# Patient Record
Sex: FEMALE | Race: WHITE | NOT HISPANIC OR LATINO | Employment: FULL TIME | URBAN - METROPOLITAN AREA
[De-identification: names, ages, dates, MRNs, and addresses within clinical notes are randomized per-mention and may not be internally consistent; named-entity substitution may affect disease eponyms.]

---

## 2019-07-24 NOTE — PROGRESS NOTES
"    Subjective:     Encounter Date:07/26/2019    Patient ID: Conchita Acevedo is a 48 y.o.  white female from [place], [place], .    REFERRING PHYSICIAN: [facility]  BARIATRIC SURGEON: BRANDON Jacobs MD  INTEGRATED HORMONE SPECIALIST: Fabi Martinez MD  HEMATOLOGIST: ROBBY Quinn MD  NEUROLOGIST: Madelyn Hanna MD  REMOTE CARDIOLOGIST: Trent Schafer MD    Chief Complaint:   Chief Complaint   Patient presents with   • Palpitations     Consult    • Chest Pain     Problem List:  1. Palpitations:  a. Remote stress test, echocardiogram, and CT cardiac calcium score at [facility] approximately 2007; negative per patient-data deficit  b. Remote stress test and echocardiogram approximately 2010; negative per patient-data deficit, performed for cardiac clearance before her gastric sleeve  c. Remote stress test approximately 2014 at [facility]; negative per patient-data deficit  d. Residual class I symptoms with acceptable EKG, July 2019  2. GERD  3. Anxiety/depression  4. PCOS  5. Type 2 diabetes mellitus; hemoglobin A1c 7.7%  6. Thyroid dysfunction  7. Mild obesity: BMI 31.1  8. Chronic low ferritin levels  9. History of mitral valve prolapse  10. Family history of early CAD: Mother with MI at 45, followed by CABG x4 vessels, multiple paternal relatives with \"widowmakers\" in their 50s  11. Surgical history:  a. Gastric sleeve, July 2010  b. Lap cholecystectomy, April 2011  c. Hiatal hernia repair with partial gastrectomy, July 2015  d. Back surgery  e. ANTWON  f. Bladder sling    Allergies   Allergen Reactions   • Amoxil [Amoxicillin] GI Intolerance   • Lovenox [Enoxaparin] Rash   • Sulfa Antibiotics Rash         Current Outpatient Medications:   •  aspirin 81 MG EC tablet, Take 81 mg by mouth Daily., Disp: , Rfl:   •  Cholecalciferol (VITAMIN D3) 5000 units capsule capsule, Take 5,000 Units by mouth Daily., Disp: , Rfl:   •  escitalopram (LEXAPRO) 20 MG " "tablet, Take 20 mg by mouth Daily., Disp: , Rfl:   •  INVOKAMET  MG tablet, Take 1 tablet by mouth 2 (Two) Times a Day With Meals., Disp: , Rfl: 6  •  Multiple Vitamin (MULTI VITAMIN PO), Take  by mouth Daily., Disp: , Rfl:   •  omeprazole (priLOSEC) 40 MG capsule, Take 40 mg by mouth Daily., Disp: , Rfl:   •  Thyroid 60 MG PO tablet, Take 60 mg by mouth Daily., Disp: , Rfl:     History of Present Illness  This Is a 48-year-old white female who presents to establish cardiology care today for having intermittent palpitations on a daily basis as well as \"chest fullness.\"  When she has the palpitations, they only last a couple of seconds and then resolve.  She drinks a couple of cups of coffee in the morning and occasionally has a glass of unsweetened ice tea in the afternoon, but this is not very frequent.  She denies any decongestant use.  She has concerns because she has a strong family history of early CAD.  She states that all of her father's brothers and other family members have had  makers and have had heart attacks in their 50s.  She states that her father was the only one that did not have a heart attack until age 62.  Her mother also had early CAD and had her first MI at 45 years of age and had a CABG x4 vessels.  Her mother passed away at age 65 from uncontrolled type 2 diabetes mellitus and had been on dialysis.  She denies any PIH or preeclampsia during her two pregnancies, and the patient had diabetes mellitus before she got pregnant and switched to insulin while she was pregnant and then back to metformin after she delivered.  She has noticed lately that her hemoglobin A1c has been elevating, and 2 weeks ago, she was switched from metformin to Invokana.  She has known GERD and has had a hiatal hernia repair.  She has noticed more symptoms of burping and feels like her reflux is traveling up her chest sometimes.  She does not have orthopnea but occasionally snores; however, she denies any " apneic spells or daytime sleepiness.  She denies any MIs, heart catheterizations, heart monitors, hypertension, hyperlipidemia, CVAs, TIAs, seizures, DVTs, PEs, kidney dysfunction, COPD, asthma, claudication, or cardiomegaly.  She was remotely diagnosed with mitral valve prolapse but has had repeat echocardiograms which did not demonstrate this.  She has no thyroid dysfunction and states that she cannot be diagnosed as hypo-or hyperthyroid but that her ratios were out of balance, so she is now on thyroid 60 mg daily.  She remotely had a couple of stress tests.  Before she had her gastric sleeve surgery, she had a stress test for cardiac clearance.  She had another stress test approximately 5 years ago at the [facility] which was also negative.  She has had a couple of echocardiograms but has never had to wear a heart monitor in the past.  After her second daughter was born, she was experiencing some chest pain and palpitations, and at the [facility], they wanted to do a heart catheterization, but the patient did not want to do that at that time because her daughter was only 2 months old.  Instead, she did a CT cardiac calcium score, which was apparently normal.  She has had some left lower extremity swelling lately and saw her physician who did some blood work and a venous duplex which were both normal-data deficit.  She chronically has had low ferritin and is supposed to see a hematologist soon for possible iron infusions.  She denies any anemia or melena.  The patient also denies any shortness of breath, dizziness, presyncope, or syncope.  She has never had rheumatic fever or any other prolonged fevers as a child.  Patient feels that her Prilosec does not help her GERD.  She has been told by her neurologist that she should not walk on a treadmill.  Patient otherwise denies prolonged chest pain, unrelieved shortness of breath, PND, edema, sustained tachypalpitations, syncope or presyncope  at this time.    Cardiovascular Disease Risk Factors  diabetes mellitus, family history, obesity    Social History     Socioeconomic History   • Marital status:      Spouse name: Not on file   • Number of children: 2   • Years of education: Not on file   • Highest education level: Not on file   Tobacco Use   • Smoking status: Never Smoker   • Smokeless tobacco: Never Used   Substance and Sexual Activity   • Alcohol use: No     Frequency: Never   • Drug use: No   • Sexual activity: Defer       Family History   Problem Relation Age of Onset   • Diabetes Mother    • Heart disease Mother    • Kidney failure Mother    • Heart disease Father    • Stroke Father    • Diabetes Father    • Heart attack Father    • Heart disease Maternal Grandmother    • Diabetes Maternal Grandmother    • Stroke Maternal Grandmother    • Heart disease Paternal Grandmother    • Diabetes Paternal Grandmother    • Stroke Paternal Grandmother    · Mother: MI at 45, CABG x4, uncontrolled type 2 diabetes mellitus, on dialysis,  at 65  · Father: MI at 62 with  maker  · Multiple paternal relatives with MIs/ makers in their 50s  · 2 daughters: Metabolic syndrome  · Sister: Palpitations    Review of Systems   Constitution: Positive for malaise/fatigue.   HENT: Negative.    Eyes: Negative.    Cardiovascular: Positive for chest pain, irregular heartbeat, leg swelling and palpitations. Negative for claudication, dyspnea on exertion, near-syncope, orthopnea, paroxysmal nocturnal dyspnea and syncope.   Respiratory: Positive for snoring. Negative for shortness of breath and sleep disturbances due to breathing.    Endocrine:        Thyroid dysfunction, type 2 diabetes mellitus   Hematologic/Lymphatic:        Chronic low ferritin levels   Skin: Negative.    Musculoskeletal: Negative.    Gastrointestinal: Positive for abdominal pain, change in bowel habit and heartburn. Negative for melena.   Genitourinary: Positive for bladder  "incontinence and frequency.   Neurological: Negative for excessive daytime sleepiness, dizziness, focal weakness, light-headedness and seizures.   Psychiatric/Behavioral: The patient is nervous/anxious.    Allergic/Immunologic: Negative.       Obtained and negative except as outlined in problem list and HPI.      ECG 12 Lead  Date/Time: 7/26/2019 12:33 PM  Performed by: Shan Ross MD  Authorized by: Shan Ross MD   Rhythm comments: Sinus bradycardia, low voltage QRS, borderline ECG, 58 bpm,  ms, QRS 80 ms,  ms, no significant changes from last ECG in August 2016                 Objective:       Vitals:    07/26/19 1035 07/26/19 1036 07/26/19 1037 07/26/19 1038   BP: 128/77 119/78 132/77 120/76   BP Location: Left arm Left arm Right arm Right arm   Patient Position: Sitting Standing Sitting Standing   Pulse: 58 69 59 68   Weight: 80.8 kg (178 lb 3.2 oz)      Height: 161.3 cm (63.5\")        Body mass index is 31.07 kg/m².     Physical Exam   Constitutional: She is oriented to person, place, and time. She appears well-developed and well-nourished.   HENT:   Mouth/Throat: Uvula is midline, oropharynx is clear and moist and mucous membranes are normal. She does not have dentures. No oral lesions. Normal dentition. No dental abscesses, uvula swelling, lacerations or dental caries.   Eyes:   Fundoscopic exam:       The right eye shows AV nicking. The right eye shows no exudate and no hemorrhage.        The left eye shows AV nicking. The left eye shows no exudate and no hemorrhage.   Neck: No JVD present. Carotid bruit is not present. No thyromegaly present.   Cardiovascular: Regular rhythm, S1 normal and S2 normal. Exam reveals no gallop, no S3 and no friction rub.   Murmur heard.   Medium-pitched harsh early systolic murmur is present with a grade of 1/6 at the lower left sternal border.  Pulses:       Dorsalis pedis pulses are 2+ on the right side, and 2+ on the left side.        Posterior tibial " pulses are 2+ on the right side, and 2+ on the left side.   Pulmonary/Chest: Effort normal and breath sounds normal. She has no wheezes. She has no rhonchi. She has no rales.   Abdominal: Soft. She exhibits no mass. There is no hepatosplenomegaly. There is no tenderness. There is no guarding.   Bowel sounds audible x4   Musculoskeletal: Normal range of motion. She exhibits edema (LLE 1+).   Lymphadenopathy:     She has no cervical adenopathy.   Neurological: She is alert and oriented to person, place, and time.   Skin: Skin is warm, dry and intact. No rash noted.   Vitals reviewed.      Lab Review:   Results for orders placed or performed in visit on 12/19/16   Tissue Exam   Result Value Ref Range    Case Report       Surgical Pathology Report                         Case: [id number]                                  Authorizing Provider:  Narendra Jacobs MD     Collected:           12/19/2016 10:03 AM          Pathologist:           Winter Stern MD       Received:            12/19/2016 01:33 PM          Specimens:   1) - Gastric, Antrum                                                                                2) - Esophagus, Distal                                                                     Clinical Information       The working history is heartburn.       Final Diagnosis       1. GASTRIC ANTRUM BIOPSIES:  Reactive gastritis.   2. DISTAL ESOPHAGEAL BIOPSIES:  Histologic findings compatible with reflux.   MHB/klb       Gross Description       Specimen 1 received in formalin labeled antrum biopsy is a 0.6x0.3x0.3 cm tan soft tissue fragment submitted in toto in cassette 1.    Specimen 2 received in formalin labeled distal esophagus is a 0.3x0.3x0.3 cm tan soft tissue fragment submitted in toto in cassette 2. HBM/mbc       Microscopic Description       Sections show mild reactive and regenerative changes and slight chronic inflammation.     Specimen 2 shows predominantly hyperplastic squamous  "mucosa with underlying chronic inflammation. No York's metaplasia, or dysplasia is seen.       Embedded Images       ECG 8/2/2016:  Sinus bradycardia  Otherwise normal ECG  When compared with ECG of 24-APR-2015 10:38,  No significant change was found  Confirmed by TEAGAN WILCOX MD ([id number]) on 8/2/2016 6:33:36 PM      Assessment:   Patient with intermittent palpitations and \"chest fullness\" with history of MVP and strong family history of early CAD. We will order an echocardiogram to assess heart structure/function as well as a Dobutamine stress echocardiogram to rule out focal obstructive CAD. The patient is unable to walk on a treadmill. We will also order a CT cardiac calcium score to assess any coronary calcification. She has had intermittent short lived palpitations, and we will place a ZioXT while in office today to assess for any arrhythmias, PAF, or pauses. Her GERD has been unrelieved by Prilosec, so we will change this to Dexilant 60 mg daily.       Diagnosis Plan   1. Palpitations  Holter Monitor - 72 Hour Up To 21 Days    Adult Stress Echo W/ Cont or Stress Agent if Necessary Per Protocol, echocardiogram    2. Chest pain, unspecified type  CT cardiac calcium score w/o dye   3. Family history of early CAD  Dobutamine stress echo   4. Chest pain due to GERD  Change Prilosec to dexilant 60mg daily          Plan:   1. Patient to continue current medications and close follow up with the above providers other than to discontinue Prilosec and initiate Dexilant 60 mg daily.  2. Tentative cardiology follow up in 6 weeks or patient may return sooner PRN.  3. ZioXT monitor placed today  4. Echocardiogram  5. Dobutamine stress echo  6. CT cardiac calcium score  7. 1 800 card    Scribed for Teagan Wilcox MD by Brenda Shah, APRN. 7/26/2019  12:41 PM    I, Teagan Wilcox MD, [facility], personally performed the services described in this documentation as scribed by the above named individual in my presence, and it is " both accurate and complete. At 12:58 PM on 07/26/2019

## 2019-07-26 ENCOUNTER — CONSULT (OUTPATIENT)
Dept: CARDIOLOGY | Facility: CLINIC | Age: 49
End: 2019-07-26

## 2019-07-26 VITALS
BODY MASS INDEX: 30.42 KG/M2 | HEIGHT: 64 IN | HEART RATE: 68 BPM | DIASTOLIC BLOOD PRESSURE: 76 MMHG | SYSTOLIC BLOOD PRESSURE: 120 MMHG | WEIGHT: 178.2 LBS

## 2019-07-26 DIAGNOSIS — R07.9 CHEST PAIN DUE TO GERD: ICD-10-CM

## 2019-07-26 DIAGNOSIS — Z82.49 FAMILY HISTORY OF EARLY CAD: ICD-10-CM

## 2019-07-26 DIAGNOSIS — R00.2 PALPITATIONS: Primary | ICD-10-CM

## 2019-07-26 DIAGNOSIS — K21.9 CHEST PAIN DUE TO GERD: ICD-10-CM

## 2019-07-26 DIAGNOSIS — R07.9 CHEST PAIN, UNSPECIFIED TYPE: ICD-10-CM

## 2019-07-26 PROBLEM — E07.9 THYROID DYSFUNCTION: Status: ACTIVE | Noted: 2019-07-26

## 2019-07-26 PROBLEM — R07.89 CHEST DISCOMFORT: Status: ACTIVE | Noted: 2019-07-26

## 2019-07-26 PROBLEM — R79.0 LOW FERRITIN: Status: ACTIVE | Noted: 2019-07-26

## 2019-07-26 PROBLEM — E11.8 TYPE 2 DIABETES MELLITUS WITH COMPLICATION, WITHOUT LONG-TERM CURRENT USE OF INSULIN (HCC): Status: ACTIVE | Noted: 2019-07-26

## 2019-07-26 PROCEDURE — 99204 OFFICE O/P NEW MOD 45 MIN: CPT | Performed by: INTERNAL MEDICINE

## 2019-07-26 PROCEDURE — 93000 ELECTROCARDIOGRAM COMPLETE: CPT | Performed by: INTERNAL MEDICINE

## 2019-07-26 RX ORDER — ESCITALOPRAM OXALATE 20 MG/1
20 TABLET ORAL DAILY
COMMUNITY

## 2019-07-26 RX ORDER — CANAGLIFLOZIN AND METFORMIN HYDROCHLORIDE 50; 500 MG/1; MG/1
1 TABLET, FILM COATED ORAL 2 TIMES DAILY WITH MEALS
Refills: 6 | COMMUNITY
Start: 2019-07-15 | End: 2019-11-14

## 2019-07-26 RX ORDER — DEXLANSOPRAZOLE 60 MG/1
60 CAPSULE, DELAYED RELEASE ORAL DAILY
Qty: 30 CAPSULE | Refills: 0 | Status: SHIPPED | OUTPATIENT
Start: 2019-07-26 | End: 2019-08-29 | Stop reason: SDUPTHER

## 2019-07-26 RX ORDER — ASPIRIN 81 MG/1
81 TABLET ORAL DAILY
COMMUNITY
End: 2019-12-20 | Stop reason: HOSPADM

## 2019-07-26 RX ORDER — LEVOTHYROXINE AND LIOTHYRONINE 38; 9 UG/1; UG/1
45 TABLET ORAL DAILY
COMMUNITY

## 2019-07-26 RX ORDER — OMEPRAZOLE 40 MG/1
40 CAPSULE, DELAYED RELEASE ORAL DAILY
COMMUNITY
End: 2019-07-26 | Stop reason: ALTCHOICE

## 2019-07-30 ENCOUNTER — TELEPHONE (OUTPATIENT)
Dept: CARDIOLOGY | Facility: CLINIC | Age: 49
End: 2019-07-30

## 2019-07-30 NOTE — TELEPHONE ENCOUNTER
Pt called, Zio monitor fell off Friday night, after getting if put on Friday morning.     Pt is mailing Zio back to company and Dori if mailing a new monitor to the pt to put on herself and wear for 14 days.     Pt verbalized understanding and it agreeable to plan.

## 2019-08-22 ENCOUNTER — HOSPITAL ENCOUNTER (OUTPATIENT)
Dept: CT IMAGING | Facility: HOSPITAL | Age: 49
Discharge: HOME | End: 2019-08-22
Admitting: INTERNAL MEDICINE

## 2019-08-22 ENCOUNTER — HOSPITAL ENCOUNTER (OUTPATIENT)
Dept: CARDIOLOGY | Facility: HOSPITAL | Age: 49
Discharge: HOME | End: 2019-08-22
Admitting: INTERNAL MEDICINE

## 2019-08-22 DIAGNOSIS — R00.2 PALPITATIONS: ICD-10-CM

## 2019-08-22 DIAGNOSIS — R07.9 CHEST PAIN, UNSPECIFIED TYPE: ICD-10-CM

## 2019-08-22 LAB
BH CV ECHO MEAS - AO ROOT AREA (BSA CORRECTED): 1.5
BH CV ECHO MEAS - AO ROOT AREA: 6.2 CM^2
BH CV ECHO MEAS - AO ROOT DIAM: 2.8 CM
BH CV ECHO MEAS - BSA(HAYCOCK): 1.9 M^2
BH CV ECHO MEAS - BSA: 1.8 M^2
BH CV ECHO MEAS - BZI_BMI: 31.5 KILOGRAMS/M^2
BH CV ECHO MEAS - BZI_METRIC_HEIGHT: 160 CM
BH CV ECHO MEAS - BZI_METRIC_WEIGHT: 80.7 KG
BH CV ECHO MEAS - EDV(CUBED): 58.3 ML
BH CV ECHO MEAS - EDV(TEICH): 65 ML
BH CV ECHO MEAS - EF(CUBED): 89.6 %
BH CV ECHO MEAS - EF(TEICH): 84.5 %
BH CV ECHO MEAS - ESV(CUBED): 6.1 ML
BH CV ECHO MEAS - ESV(TEICH): 10.1 ML
BH CV ECHO MEAS - FS: 52.9 %
BH CV ECHO MEAS - IVS/LVPW: 1.1
BH CV ECHO MEAS - IVSD: 1 CM
BH CV ECHO MEAS - LA DIMENSION: 3.6 CM
BH CV ECHO MEAS - LA/AO: 1.3
BH CV ECHO MEAS - LAD MAJOR: 4.6 CM
BH CV ECHO MEAS - LAT PEAK E' VEL: 14.1 CM/SEC
BH CV ECHO MEAS - LATERAL E/E' RATIO: 6.1
BH CV ECHO MEAS - LV MASS(C)D: 114.1 GRAMS
BH CV ECHO MEAS - LV MASS(C)DI: 62 GRAMS/M^2
BH CV ECHO MEAS - LVIDD: 3.9 CM
BH CV ECHO MEAS - LVIDS: 2.4 CM
BH CV ECHO MEAS - LVPWD: 0.9 CM
BH CV ECHO MEAS - MED PEAK E' VEL: 7.9 CM/SEC
BH CV ECHO MEAS - MEDIAL E/E' RATIO: 10.9
BH CV ECHO MEAS - MV A MAX VEL: 75 CM/SEC
BH CV ECHO MEAS - MV DEC SLOPE: 465.9 CM/SEC^2
BH CV ECHO MEAS - MV DEC TIME: 0.17 SEC
BH CV ECHO MEAS - MV E MAX VEL: 87.9 CM/SEC
BH CV ECHO MEAS - MV E/A: 1.2
BH CV ECHO MEAS - MV P1/2T MAX VEL: 114 CM/SEC
BH CV ECHO MEAS - MV P1/2T: 71.7 MSEC
BH CV ECHO MEAS - MVA P1/2T LCG: 1.9 CM^2
BH CV ECHO MEAS - MVA(P1/2T): 3.1 CM^2
BH CV ECHO MEAS - PA ACC SLOPE: 618.6 CM/SEC^2
BH CV ECHO MEAS - PA ACC TIME: 0.16 SEC
BH CV ECHO MEAS - PA PR(ACCEL): 6.1 MMHG
BH CV ECHO MEAS - RAP SYSTOLE: 3 MMHG
BH CV ECHO MEAS - RV MAX PG: 1.2 MMHG
BH CV ECHO MEAS - RV V1 MAX: 55.8 CM/SEC
BH CV ECHO MEAS - RVSP: 15 MMHG
BH CV ECHO MEAS - SI(CUBED): 28.4 ML/M^2
BH CV ECHO MEAS - SI(TEICH): 29.9 ML/M^2
BH CV ECHO MEAS - SV(CUBED): 52.2 ML
BH CV ECHO MEAS - SV(TEICH): 54.9 ML
BH CV ECHO MEAS - TAPSE (>1.6): 1.8 CM2
BH CV ECHO MEAS - TR MAX PG: 12 MMHG
BH CV ECHO MEAS - TR MAX VEL: 174.4 CM/SEC
BH CV ECHO MEASUREMENTS AVERAGE E/E' RATIO: 7.99
BH CV STRESS BP STAGE 1: NORMAL
BH CV STRESS BP STAGE 2: NORMAL
BH CV STRESS BP STAGE 3: NORMAL
BH CV STRESS BP STAGE 4: NORMAL
BH CV STRESS DOSE DOBUTAMINE STAGE 1: 10
BH CV STRESS DOSE DOBUTAMINE STAGE 2: 20
BH CV STRESS DOSE DOBUTAMINE STAGE 3: 30
BH CV STRESS DOSE DOBUTAMINE STAGE 4: 40
BH CV STRESS DURATION MIN STAGE 1: 2
BH CV STRESS DURATION MIN STAGE 2: 2
BH CV STRESS DURATION MIN STAGE 3: 2
BH CV STRESS DURATION MIN STAGE 4: 2
BH CV STRESS DURATION SEC STAGE 1: 0
BH CV STRESS DURATION SEC STAGE 2: 0
BH CV STRESS DURATION SEC STAGE 3: 0
BH CV STRESS DURATION SEC STAGE 4: 25
BH CV STRESS ECHO POST STRESS EJECTION FRACTION EF: 75 %
BH CV STRESS HR STAGE 1: 85
BH CV STRESS HR STAGE 2: 104
BH CV STRESS HR STAGE 3: 134
BH CV STRESS HR STAGE 4: 144
BH CV STRESS PROTOCOL 1: NORMAL
BH CV STRESS RATE STAGE 1: 40
BH CV STRESS RATE STAGE 2: 80
BH CV STRESS RATE STAGE 3: 119
BH CV STRESS RATE STAGE 4: 159
BH CV STRESS RECOVERY BP: NORMAL MMHG
BH CV STRESS RECOVERY HR: 94 BPM
BH CV STRESS STAGE 1: 1
BH CV STRESS STAGE 2: 2
BH CV STRESS STAGE 3: 3
BH CV STRESS STAGE 4: 4
BH CV XLRA - RV BASE: 3.8 CM
BH CV XLRA - RV LENGTH: 5.2 CM
BH CV XLRA - RV MID: 3.2 CM
BH CV XLRA - TDI S': 13.6 CM/SEC
LEFT ATRIUM VOLUME INDEX: 20.1 ML/M^2
LEFT ATRIUM VOLUME: 37 ML
LV EF 2D ECHO EST: 68 %
MAXIMAL PREDICTED HEART RATE: 173 BPM
PERCENT MAX PREDICTED HR: 86.71 %
STRESS BASELINE BP: NORMAL MMHG
STRESS BASELINE HR: 57 BPM
STRESS PERCENT HR: 102 %
STRESS POST ESTIMATED WORKLOAD: 1 METS
STRESS POST EXERCISE DUR MIN: 8 MIN
STRESS POST EXERCISE DUR SEC: 25 SEC
STRESS POST PEAK BP: NORMAL MMHG
STRESS POST PEAK HR: 150 BPM
STRESS TARGET HR: 147 BPM

## 2019-08-22 PROCEDURE — 93350 STRESS TTE ONLY: CPT | Performed by: INTERNAL MEDICINE

## 2019-08-22 PROCEDURE — 25010000002 DOBUTAMINE PER 250 MG: Performed by: INTERNAL MEDICINE

## 2019-08-22 PROCEDURE — 93018 CV STRESS TEST I&R ONLY: CPT | Performed by: INTERNAL MEDICINE

## 2019-08-22 PROCEDURE — 75571 CT HRT W/O DYE W/CA TEST: CPT

## 2019-08-22 PROCEDURE — 93325 DOPPLER ECHO COLOR FLOW MAPG: CPT

## 2019-08-22 PROCEDURE — 93320 DOPPLER ECHO COMPLETE: CPT | Performed by: INTERNAL MEDICINE

## 2019-08-22 PROCEDURE — 93320 DOPPLER ECHO COMPLETE: CPT

## 2019-08-22 PROCEDURE — 93350 STRESS TTE ONLY: CPT

## 2019-08-22 PROCEDURE — 93325 DOPPLER ECHO COLOR FLOW MAPG: CPT | Performed by: INTERNAL MEDICINE

## 2019-08-22 PROCEDURE — 93017 CV STRESS TEST TRACING ONLY: CPT

## 2019-08-22 RX ORDER — DOBUTAMINE HYDROCHLORIDE 100 MG/100ML
20 INJECTION INTRAVENOUS
Status: DISCONTINUED | OUTPATIENT
Start: 2019-08-22 | End: 2019-08-23 | Stop reason: HOSPADM

## 2019-08-22 RX ADMIN — DOBUTAMINE HYDROCHLORIDE 20 MCG/KG/MIN: 100 INJECTION INTRAVENOUS at 14:09

## 2019-08-29 RX ORDER — DEXLANSOPRAZOLE 60 MG/1
60 CAPSULE, DELAYED RELEASE ORAL DAILY
Qty: 30 CAPSULE | Refills: 11 | Status: SHIPPED | OUTPATIENT
Start: 2019-08-29 | End: 2019-12-10 | Stop reason: SDUPTHER

## 2019-08-29 NOTE — TELEPHONE ENCOUNTER
Pt called to verify results of stress ECHO that pt received via [facility] letter.    Told patient that LHC was not indicated at this time.    Pt verbalizes understanding and agreeable.    Pt asked about holter monitor results, but those have not been received yet.  Pt states she mailed monitor back around 8/15/19. Spoke with Dori and she states the monitor is registered to patient, so were waiting on the company to send those results.     Pt wanted refills of Dexilant 60 mg daily, refill sent to [facility].

## 2019-08-30 ENCOUNTER — OFFICE VISIT (OUTPATIENT)
Dept: BARIATRICS/WEIGHT MGMT | Facility: CLINIC | Age: 49
End: 2019-08-30

## 2019-08-30 VITALS
SYSTOLIC BLOOD PRESSURE: 118 MMHG | OXYGEN SATURATION: 99 % | RESPIRATION RATE: 18 BRPM | DIASTOLIC BLOOD PRESSURE: 70 MMHG | WEIGHT: 177 LBS | HEIGHT: 64 IN | HEART RATE: 52 BPM | BODY MASS INDEX: 30.22 KG/M2 | TEMPERATURE: 96.1 F

## 2019-08-30 DIAGNOSIS — Z90.3 POSTGASTRECTOMY MALABSORPTION: ICD-10-CM

## 2019-08-30 DIAGNOSIS — Z13.21 MALNUTRITION SCREEN: ICD-10-CM

## 2019-08-30 DIAGNOSIS — R53.83 FATIGUE, UNSPECIFIED TYPE: ICD-10-CM

## 2019-08-30 DIAGNOSIS — Z98.84 STATUS POST BARIATRIC SURGERY: Primary | ICD-10-CM

## 2019-08-30 DIAGNOSIS — K21.9 GASTROESOPHAGEAL REFLUX DISEASE, ESOPHAGITIS PRESENCE NOT SPECIFIED: ICD-10-CM

## 2019-08-30 DIAGNOSIS — K91.2 POSTGASTRECTOMY MALABSORPTION: ICD-10-CM

## 2019-08-30 DIAGNOSIS — Z13.0 SCREENING, IRON DEFICIENCY ANEMIA: ICD-10-CM

## 2019-08-30 DIAGNOSIS — R14.2 BELCHING: ICD-10-CM

## 2019-08-30 DIAGNOSIS — E55.9 HYPOVITAMINOSIS D: ICD-10-CM

## 2019-08-30 DIAGNOSIS — E66.9 OBESITY, CLASS I, BMI 30-34.9: ICD-10-CM

## 2019-08-30 PROCEDURE — 99214 OFFICE O/P EST MOD 30 MIN: CPT | Performed by: PHYSICIAN ASSISTANT

## 2019-08-30 NOTE — PROGRESS NOTES
[facility]  [address]  [facility] [id number] [phone]        Patient Name:  Conchita Acevedo.  :  1970        Reason for Visit:    9 years s/p LSG, reflux     HPI: Conchita Acevedo is a 49 y.o. female s/p LSG with Dr. Jacobs 7/8/10, s/p lap courtney/ intraop cholangiogram/ liver biopsy (no abnormality) with Dr. Jacobs 11, s/p lap HHR with partial gastrectomy (for outpouching of the cardia on the greater curvature side) by Dr. Jacobs 7/24/15.    UGI at [facility] 16 IMPRESSION- Small residual hiatal hernia.     EGD with Dr. Jacobs 8/3/16- mild gastrits and large surture at GE junction with surrounding foreign body reaction, suture was removed, no obvious HH, sleeve was fairly unremarkable. Distal esphageal bx revealsed ulcerative esophagitis with candida and gram + cocci. Pt was treated with biaxin + diflucan, advised h pylori testing but did not get resulted.    LOV 10/21/16- symptoms improved. Occ vomiting/ regurg.     EGD with Dr. Jacobs 16- some mild diffuse gastritis, otherwise unremarkable post sleeve upper endoscopy. No redundant cardia. No foreign bodies, no secretions, irregular z line at 35cm, no obvious elizabeth's esophagus but perhaps very short segment.     Final Diagnosis   1. GASTRIC ANTRUM BIOPSIES:  Reactive gastritis.   2. DISTAL ESOPHAGEAL BIOPSIES:  Histologic findings compatible with reflux       Presents today with a few concerns. Overall is very pleased with weightloss, happy to hear BMI is 30.9, would absolutely do LSG over again.   Is seeing integrated hormone specialist in [place] who has some concerns re: malabsorption with continued low ferritin levels and worsening A1C (6ish to 7ish%). Ferritin has been low x 2 years, although not anemic. Failed PO therapy, last labs ferritin 8, referred to hematologist. Now on TID Iron + folate + B12 + C.  Also saw Dr. Thornton Cardiologist who suggested f/u here due to symptoms of  uncontrolled reflux and frequent belching x 8 months.  Was on omeprazole 40mg daily with  episodes of uncontrolled reflux that would last for a couple days and require BID PPI + tums, 6-8 days of the month.  Recently changed to dexilant, has helped reflux with no breakthrough symptoms.  No CP or chest pressure or dysphagia.   Has also had belching daily, with most PO intake, ongoing x 8 months.  Has lower abdominal pain preceding BM occasionally, with no upper abdominal pain.  A1C has increased despite diet control with reduction of carbs.   Questioning whether gastric bypass is an appropriate next step to help with DM.   No other issues/concerns. Denies dysphagia, nausea, vomiting, pulmonary issues and fevers.      Getting +g prot/day, not taking protein supplements.  Typical day: Oatmeal or nutrigrain waffle for breakfast, chicken/ protein + vegetable for lunch, chicken/ protein meat + vegetables + sweet potato or rice for dinner.   Drinking 64++ fluid oz/day, coffee in am + water, possibly unsweet tea.    Last labs revealed low ferritin from hematologist .  Taking MVI and Vit D 5000 daily, now on iron TID + C + folic acid + B12.  On dexilant.  Exercising.    Presurgery weight: 222 pounds.  Today's weight is 80.3 kg (177 lb) pounds, today's  Body mass index is 30.86 kg/m²., and her weight loss since surgery is 45 pounds.      Past Medical History:   Diagnosis Date   • Acid reflux    • Anxiety    • Diabetes mellitus (CMS/HCC)    • MRSA infection    • PCOS (polycystic ovarian syndrome)    • Thyroid disorder      Past Surgical History:   Procedure Laterality Date   • BACK SURGERY  2015   • BLADDER SLING MODIFIED, ANTERIOR AND POSTERIOR VAGINAL REPAIR  2016   • ENDOSCOPY  12/19/2016     Dr. Jacobs, some mild diffuse gastritis,   • ENDOSCOPY  08/03/2016    Dr. Jacobs, mild gastrits and large surture at GE junction with surrounding foreign body reaction, suture was removed, no obvious HH, sleeve was fairly  "unremarkable. Distal esphageal bx revealsed ulcerative esophagitis with candida and gram + cocci.    • GASTRIC SLEEVE LAPAROSCOPIC  07/08/2010   • HIATAL HERNIA REPAIR  07/2015    Dr. Jacobs, [facility] partial gastectomy for outpouching of cardia   • HYSTERECTOMY  2018    vaginal, partial   • LAPAROSCOPIC CHOLECYSTECTOMY  04/13/2011    Dr. Jacobs     Outpatient Medications Marked as Taking for the 8/30/19 encounter (Office Visit) with Dori Patel PA-C   Medication Sig Dispense Refill   • aspirin 81 MG EC tablet Take 81 mg by mouth Daily.     • Cholecalciferol (VITAMIN D3) 5000 units capsule capsule Take 5,000 Units by mouth Daily.     • dexlansoprazole (DEXILANT) 60 MG capsule Take 1 capsule by mouth Daily. 30 capsule 11   • escitalopram (LEXAPRO) 20 MG tablet Take 20 mg by mouth Daily.     • INVOKAMET  MG tablet Take 1 tablet by mouth 2 (Two) Times a Day With Meals.  6   • Multiple Vitamin (MULTI VITAMIN PO) Take  by mouth Daily.     • Thyroid 60 MG PO tablet Take 60 mg by mouth Daily.         Allergies   Allergen Reactions   • Amoxil [Amoxicillin] GI Intolerance   • Lovenox [Enoxaparin] Rash   • Sulfa Antibiotics Rash       Social History     Socioeconomic History   • Marital status:      Spouse name: Not on file   • Number of children: Not on file   • Years of education: Not on file   • Highest education level: Not on file   Tobacco Use   • Smoking status: Never Smoker   • Smokeless tobacco: Never Used   Substance and Sexual Activity   • Alcohol use: No     Frequency: Never   • Drug use: No   • Sexual activity: Defer   Social History Narrative    Lives in [place] with  and 2 daughters. Works as .        /70 (BP Location: Left arm, Patient Position: Sitting, Cuff Size: Adult)   Pulse 52   Temp 96.1 °F (35.6 °C) (Temporal)   Resp 18   Ht 161.3 cm (63.5\")   Wt 80.3 kg (177 lb)   SpO2 99%   BMI 30.86 kg/m²     Physical Exam   Constitutional: She is oriented to person, " place, and time. She appears well-developed and well-nourished.   HENT:   Head: Normocephalic and atraumatic.   Cardiovascular: Normal rate, regular rhythm and normal heart sounds.   Pulmonary/Chest: Effort normal and breath sounds normal. No respiratory distress. She has no wheezes.   Abdominal: Soft. Bowel sounds are normal. She exhibits no distension. There is no tenderness.   Incisions healing well   Neurological: She is alert and oriented to person, place, and time.   Skin: Skin is warm and dry.   Psychiatric: She has a normal mood and affect. Her behavior is normal. Judgment and thought content normal.         Assessment:  s/p LSG with Dr. Jacobs 7/8/10, s/p courntey with Dr. Jacobs 2011, s/p lap HHR with partial gastrectomy (for outpouching of the cardia on the greater curvature side) by Dr. Jacobs 7/2015.      ICD-10-CM ICD-9-CM   1. Status post bariatric surgery Z98.84 V45.86   2. Obesity, Class I, BMI 30-34.9 E66.9 278.00   3. Fatigue, unspecified type R53.83 780.79   4. Hypovitaminosis D E55.9 268.9   5. Screening, iron deficiency anemia Z13.0 V78.0   6. Malnutrition screen Z13.21 V77.2   7. Postgastrectomy malabsorption K91.2 579.3    Z90.3    8. Gastroesophageal reflux disease, esophagitis presence not specified K21.9 530.81         Plan:  Will evaluate with UGI, likely followed by EGD.  Reflux controlled on dexilant, continue  This.  Otherwise, continue w/ good food choices and healthy habits.  Continue to focus on high protein, low carb.  Keep tracking intake.  Encouraged routine exercise.  Routine bariatric labs ordered, offered deferring as specialists are also following vitamin levels, patient desires full eval.  Continue vitamins w/ adjustments pending lab results.  Malabsorption issues tylically not seen s/p LSG, continue workup and management with hematologist for iron deficiency.  Discussed Dr. Jacobs' standpoint on revision surgery, suggested second option if interested in gastric bypass.  Will f/u  on imaging results and further plan. Call w/ problems/concerns.     The patient was instructed to follow up in 1 year, sooner if needed.      Total time spent w/ patient 25 minutes and 15 minutes spent counseling the patient on nutrition and necessary dietary/lifestyle modifications.      Addendum:     UGI at [facility] 9/27/19  IMPRESSION:  Very small intermittent sliding-type hiatal hernia less than  2 cm. There is marked gastroesophageal reflux in a dilated esophagus to  the level of the cervical esophagus.     Will proceed with EGD for further evaluation.

## 2019-09-04 LAB
25(OH)D3+25(OH)D2 SERPL-MCNC: 70.7 NG/ML (ref 30–100)
ALBUMIN SERPL-MCNC: 4.1 G/DL (ref 3.5–5.5)
ALBUMIN/GLOB SERPL: 1.5 {RATIO} (ref 1.2–2.2)
ALP SERPL-CCNC: 86 IU/L (ref 39–117)
ALT SERPL-CCNC: 20 IU/L (ref 0–32)
AST SERPL-CCNC: 14 IU/L (ref 0–40)
BASOPHILS # BLD AUTO: 0 X10E3/UL (ref 0–0.2)
BASOPHILS NFR BLD AUTO: 0 %
BILIRUB SERPL-MCNC: <0.2 MG/DL (ref 0–1.2)
BUN SERPL-MCNC: 11 MG/DL (ref 6–24)
BUN/CREAT SERPL: 16 (ref 9–23)
CALCIUM SERPL-MCNC: 9.2 MG/DL (ref 8.7–10.2)
CHLORIDE SERPL-SCNC: 104 MMOL/L (ref 96–106)
CO2 SERPL-SCNC: 24 MMOL/L (ref 20–29)
CREAT SERPL-MCNC: 0.69 MG/DL (ref 0.57–1)
EOSINOPHIL # BLD AUTO: 0.1 X10E3/UL (ref 0–0.4)
EOSINOPHIL NFR BLD AUTO: 2 %
ERYTHROCYTE [DISTWIDTH] IN BLOOD BY AUTOMATED COUNT: 14.9 % (ref 12.3–15.4)
FERRITIN SERPL-MCNC: 6 NG/ML (ref 15–150)
FOLATE SERPL-MCNC: >20 NG/ML
GLOBULIN SER CALC-MCNC: 2.7 G/DL (ref 1.5–4.5)
GLUCOSE SERPL-MCNC: 99 MG/DL (ref 65–99)
HCT VFR BLD AUTO: 36.3 % (ref 34–46.6)
HGB BLD-MCNC: 11 G/DL (ref 11.1–15.9)
IMM GRANULOCYTES # BLD AUTO: 0 X10E3/UL (ref 0–0.1)
IMM GRANULOCYTES NFR BLD AUTO: 0 %
IRON SERPL-MCNC: 24 UG/DL (ref 27–159)
LYMPHOCYTES # BLD AUTO: 3.5 X10E3/UL (ref 0.7–3.1)
LYMPHOCYTES NFR BLD AUTO: 38 %
Lab: NORMAL
MCH RBC QN AUTO: 25 PG (ref 26.6–33)
MCHC RBC AUTO-ENTMCNC: 30.3 G/DL (ref 31.5–35.7)
MCV RBC AUTO: 83 FL (ref 79–97)
METHYLMALONATE SERPL-SCNC: 192 NMOL/L (ref 0–378)
MONOCYTES # BLD AUTO: 0.5 X10E3/UL (ref 0.1–0.9)
MONOCYTES NFR BLD AUTO: 6 %
NEUTROPHILS # BLD AUTO: 5 X10E3/UL (ref 1.4–7)
NEUTROPHILS NFR BLD AUTO: 54 %
PLATELET # BLD AUTO: 420 X10E3/UL (ref 150–450)
POTASSIUM SERPL-SCNC: 5.2 MMOL/L (ref 3.5–5.2)
PREALB SERPL-MCNC: 20 MG/DL (ref 12–34)
PROT SERPL-MCNC: 6.8 G/DL (ref 6–8.5)
RBC # BLD AUTO: 4.4 X10E6/UL (ref 3.77–5.28)
SODIUM SERPL-SCNC: 142 MMOL/L (ref 134–144)
VIT B1 BLD-SCNC: 249.8 NMOL/L (ref 66.5–200)
WBC # BLD AUTO: 9.2 X10E3/UL (ref 3.4–10.8)

## 2019-09-27 ENCOUNTER — HOSPITAL ENCOUNTER (OUTPATIENT)
Dept: GENERAL RADIOLOGY | Facility: HOSPITAL | Age: 49
Discharge: HOME | End: 2019-09-27
Admitting: PHYSICIAN ASSISTANT

## 2019-09-27 ENCOUNTER — TELEPHONE (OUTPATIENT)
Dept: CARDIOLOGY | Facility: CLINIC | Age: 49
End: 2019-09-27

## 2019-09-27 DIAGNOSIS — K21.9 GASTROESOPHAGEAL REFLUX DISEASE, ESOPHAGITIS PRESENCE NOT SPECIFIED: ICD-10-CM

## 2019-09-27 PROCEDURE — 74241: CPT

## 2019-09-27 PROCEDURE — 74241 FL UPPER GI SINGLE CONTRAST W KUB: CPT | Performed by: RADIOLOGY

## 2019-09-27 NOTE — TELEPHONE ENCOUNTER
----- Message from Yolanda Espinoza sent at 9/27/2019 10:31 AM EDT -----  Hey,    Can you please make a note in this patient's chart:    9/12/19- Called to schedule appointment, patient could not come in on the day offered.    9/18/19- Called to schedule appointment, no answer/no voicemail.    9/27/19- Called to schedule appointment, no answer/no voicemail.    Thanks,  Yolanda

## 2019-11-14 ENCOUNTER — OFFICE VISIT (OUTPATIENT)
Dept: BARIATRICS/WEIGHT MGMT | Facility: CLINIC | Age: 49
End: 2019-11-14

## 2019-11-14 VITALS
RESPIRATION RATE: 18 BRPM | BODY MASS INDEX: 30.56 KG/M2 | TEMPERATURE: 96.5 F | DIASTOLIC BLOOD PRESSURE: 64 MMHG | OXYGEN SATURATION: 100 % | HEART RATE: 58 BPM | WEIGHT: 179 LBS | HEIGHT: 64 IN | SYSTOLIC BLOOD PRESSURE: 108 MMHG

## 2019-11-14 DIAGNOSIS — K21.9 GASTROESOPHAGEAL REFLUX DISEASE, ESOPHAGITIS PRESENCE NOT SPECIFIED: Primary | ICD-10-CM

## 2019-11-14 DIAGNOSIS — R14.2 BELCHING: ICD-10-CM

## 2019-11-14 DIAGNOSIS — E66.9 OBESITY, CLASS I, BMI 30-34.9: ICD-10-CM

## 2019-11-14 DIAGNOSIS — Z98.84 STATUS POST BARIATRIC SURGERY: ICD-10-CM

## 2019-11-14 PROCEDURE — 99214 OFFICE O/P EST MOD 30 MIN: CPT | Performed by: PHYSICIAN ASSISTANT

## 2019-11-14 NOTE — PROGRESS NOTES
[facility] Bariatric Surgery  [address]  [facility], located within [facility] [id number] [phone]        Patient Name:  Conchita Acevedo.  :  1970        Reason for Visit:    9 years s/p LSG, reflux     HPI: Conchita Acevedo is a 49 y.o. female s/p LSG with Dr. Jacobs 7/8/10, s/p lap courtney/ intraop cholangiogram/ liver biopsy (no abnormality) with Dr. Jacobs 11, s/p lap HHR with partial gastrectomy (for outpouching of the cardia on the greater curvature side) by Dr. Jacobs 7/24/15.    LOV 19- overall very pleased with weightloss, concerned with persistant iron deficiency, seeing hematology, worsening A1C to 7%. C/o frequent belching x 8 months with uncontrolled reflux recently improved with initiation of dexilant.     Today states symptoms remain the same. Does not have chest pressure/ pain/ heartburn with taking dexilant but does have sour/ bitter taste in mouth she feels is a result of reflux. Concerned that although she does not feel the symptoms, radiologist told her reflux was severe.  Continues with contstant belching, after eating or drinking, noted intermittently throughout the day as well.  Has occasionally dysphagia to meats or vegetables. Denies nausea, vomiting, abd pain, fevers, chills, pulm issues.  Is now following gluten free diet, eating 5 small meals a day. Getting +g prot/day, not taking protein supplements.  Typical day: Oatmeal or nutrigrain waffle for breakfast, chicken/ protein + vegetable for lunch, chicken/ protein meat + vegetables + sweet potato or rice for dinner.   Drinking 64++ fluid oz/day, coffee in am + water, possibly unsweet tea.    Last labs revealed iron deficiency anemia, advised f/u with hematologist as they are aware and managing this chronic issue. Vit D 70, advised stopping Vitamin D.  Taking MVI, iron TID + C + folic acid + B12.  On dexilant.      Previous workup:     UGI at [facility] 19  IMPRESSION:  Very small intermittent  sliding-type hiatal hernia less than  2 cm. There is marked gastroesophageal reflux in a dilated esophagus to  the level of the cervical esophagus.    Most recent EGD with Dr. Jacobs 12/19/16- some mild diffuse gastritis, otherwise unremarkable post sleeve upper endoscopy. No redundant cardia. No foreign bodies, no secretions, irregular z line at 35cm, no obvious elizabeth's esophagus but perhaps very short segment.     Final Diagnosis   1. GASTRIC ANTRUM BIOPSIES:  Reactive gastritis.   2. DISTAL ESOPHAGEAL BIOPSIES:  Histologic findings compatible with reflux       Presurgery weight: 222 pounds.  Today's weight is 81.2 kg (179 lb) pounds, today's  Body mass index is 31.21 kg/m²., and her weight loss since surgery is 45 pounds.      Past Medical History:   Diagnosis Date   • Acid reflux    • Anxiety    • Diabetes mellitus (CMS/HCC)    • MRSA infection    • PCOS (polycystic ovarian syndrome)    • Thyroid disorder      Past Surgical History:   Procedure Laterality Date   • BACK SURGERY  2015   • BLADDER SLING MODIFIED, ANTERIOR AND POSTERIOR VAGINAL REPAIR  2016   • ENDOSCOPY  12/19/2016     Dr. Jacobs, some mild diffuse gastritis,   • ENDOSCOPY  08/03/2016    Dr. Jacobs, mild gastrits and large surture at GE junction with surrounding foreign body reaction, suture was removed, no obvious HH, sleeve was fairly unremarkable. Distal esphageal bx revealsed ulcerative esophagitis with candida and gram + cocci.    • GASTRIC SLEEVE LAPAROSCOPIC  07/08/2010   • HIATAL HERNIA REPAIR  07/2015    Dr. Jacobs, [facility] partial gastectomy for outpouching of cardia   • HYSTERECTOMY  2018    vaginal, partial   • LAPAROSCOPIC CHOLECYSTECTOMY  04/13/2011    Dr. Jacobs     Outpatient Medications Marked as Taking for the 11/14/19 encounter (Office Visit) with Dori Patel PA-C   Medication Sig Dispense Refill   • aspirin 81 MG EC tablet Take 81 mg by mouth Daily.     • dexlansoprazole (DEXILANT) 60 MG capsule Take 1 capsule by mouth Daily. 30  "capsule 11   • escitalopram (LEXAPRO) 20 MG tablet Take 20 mg by mouth Daily.     • metFORMIN (GLUCOPHAGE) 1000 MG tablet Take 1,000 mg by mouth 2 (Two) Times a Day With Meals.     • Multiple Vitamin (MULTI VITAMIN PO) Take  by mouth Daily.     • Thyroid 60 MG PO tablet Take 60 mg by mouth Daily.         Allergies   Allergen Reactions   • Amoxil [Amoxicillin] GI Intolerance   • Lovenox [Enoxaparin] Rash   • Sulfa Antibiotics Rash       Social History     Socioeconomic History   • Marital status:      Spouse name: Not on file   • Number of children: Not on file   • Years of education: Not on file   • Highest education level: Not on file   Tobacco Use   • Smoking status: Never Smoker   • Smokeless tobacco: Never Used   Substance and Sexual Activity   • Alcohol use: No     Frequency: Never   • Drug use: No   • Sexual activity: Defer   Social History Narrative    Lives in [place] with  and 2 daughters. Works as .        /64 (BP Location: Left arm, Patient Position: Sitting, Cuff Size: Adult)   Pulse 58   Temp 96.5 °F (35.8 °C) (Temporal)   Resp 18   Ht 161.3 cm (63.5\")   Wt 81.2 kg (179 lb)   SpO2 100%   BMI 31.21 kg/m²     Physical Exam   Constitutional: She is oriented to person, place, and time. She appears well-developed and well-nourished.   HENT:   Head: Normocephalic and atraumatic.   Cardiovascular: Normal rate, regular rhythm and normal heart sounds.   Pulmonary/Chest: Effort normal and breath sounds normal. No respiratory distress. She has no wheezes.   Abdominal: Soft. Bowel sounds are normal. She exhibits no distension. There is no tenderness.   Lap scars   Neurological: She is alert and oriented to person, place, and time.   Skin: Skin is warm and dry.   Psychiatric: She has a normal mood and affect. Her behavior is normal. Judgment and thought content normal.         Assessment:  s/p LSG with Dr. Jacobs 7/8/10, s/p courtney with Dr. Jacobs 2011, s/p lap HHR with " partial gastrectomy (for outpouching of the cardia on the greater curvature side) by Dr. Jacobs 7/2015.      ICD-10-CM ICD-9-CM   1. Gastroesophageal reflux disease, esophagitis presence not specified K21.9 530.81   2. Belching R14.2 787.3   3. Status post bariatric surgery Z98.84 V45.86   4. Obesity, Class I, BMI 30-34.9 E66.9 278.00         Plan:   Will proceed with EGD for further evaluation. The risks and benefits of the upper endoscopy were discussed with the patient in detail and all questions were answered.  Possibility of perforation, bleeding, aspiration, and anesthesia reaction were reviewed.  Patient agrees to proceed. Follow preop diet as discussed.

## 2019-11-18 ENCOUNTER — LAB REQUISITION (OUTPATIENT)
Dept: LAB | Facility: HOSPITAL | Age: 49
End: 2019-11-18

## 2019-11-18 DIAGNOSIS — R12 HEARTBURN: ICD-10-CM

## 2019-11-18 PROCEDURE — 88305 TISSUE EXAM BY PATHOLOGIST: CPT | Performed by: SURGERY

## 2019-11-19 LAB
CYTO UR: NORMAL
LAB AP CASE REPORT: NORMAL
LAB AP CLINICAL INFORMATION: NORMAL
PATH REPORT.FINAL DX SPEC: NORMAL
PATH REPORT.GROSS SPEC: NORMAL

## 2019-11-26 ENCOUNTER — CONSULT (OUTPATIENT)
Dept: BARIATRICS/WEIGHT MGMT | Facility: CLINIC | Age: 49
End: 2019-11-26

## 2019-11-26 VITALS
BODY MASS INDEX: 30.81 KG/M2 | HEART RATE: 65 BPM | SYSTOLIC BLOOD PRESSURE: 120 MMHG | WEIGHT: 180.5 LBS | HEIGHT: 64 IN | DIASTOLIC BLOOD PRESSURE: 74 MMHG | OXYGEN SATURATION: 99 % | TEMPERATURE: 96.2 F | RESPIRATION RATE: 18 BRPM

## 2019-11-26 DIAGNOSIS — K31.1 PARTIAL GASTRIC OUTLET OBSTRUCTION: ICD-10-CM

## 2019-11-26 DIAGNOSIS — K44.9 HIATAL HERNIA WITH GASTROESOPHAGEAL REFLUX: Primary | ICD-10-CM

## 2019-11-26 DIAGNOSIS — K21.9 HIATAL HERNIA WITH GASTROESOPHAGEAL REFLUX: Primary | ICD-10-CM

## 2019-11-26 PROCEDURE — 99213 OFFICE O/P EST LOW 20 MIN: CPT | Performed by: SURGERY

## 2019-11-26 RX ORDER — SCOLOPAMINE TRANSDERMAL SYSTEM 1 MG/1
1 PATCH, EXTENDED RELEASE TRANSDERMAL ONCE
Status: CANCELLED | OUTPATIENT
Start: 2019-11-26 | End: 2019-11-26

## 2019-11-26 RX ORDER — SODIUM CHLORIDE 0.9 % (FLUSH) 0.9 %
3-10 SYRINGE (ML) INJECTION AS NEEDED
Status: CANCELLED | OUTPATIENT
Start: 2019-11-26

## 2019-11-26 RX ORDER — PANTOPRAZOLE SODIUM 40 MG/10ML
40 INJECTION, POWDER, LYOPHILIZED, FOR SOLUTION INTRAVENOUS ONCE
Status: CANCELLED | OUTPATIENT
Start: 2019-11-26 | End: 2019-11-26

## 2019-11-26 RX ORDER — CHLORHEXIDINE GLUCONATE 0.12 MG/ML
30 RINSE ORAL
Status: CANCELLED | OUTPATIENT
Start: 2019-11-26 | End: 2019-11-26

## 2019-11-26 RX ORDER — ACETAMINOPHEN 500 MG
1000 TABLET ORAL ONCE
Status: CANCELLED | OUTPATIENT
Start: 2019-11-26 | End: 2019-11-26

## 2019-11-26 RX ORDER — SODIUM CHLORIDE 9 MG/ML
150 INJECTION, SOLUTION INTRAVENOUS CONTINUOUS
Status: CANCELLED | OUTPATIENT
Start: 2019-11-26

## 2019-11-26 RX ORDER — SODIUM CHLORIDE 0.9 % (FLUSH) 0.9 %
3 SYRINGE (ML) INJECTION EVERY 12 HOURS SCHEDULED
Status: CANCELLED | OUTPATIENT
Start: 2019-11-26

## 2019-11-26 RX ORDER — GABAPENTIN 100 MG/1
600 CAPSULE ORAL ONCE
Status: CANCELLED | OUTPATIENT
Start: 2019-11-26 | End: 2019-11-26

## 2019-12-01 NOTE — PROGRESS NOTES
"[facility] BARIATRIC SURGERY  [address]  [facility] [address] [phone]      Patient  Name:  Conchita Acevedo  :  1970      Date of Visit: 19    Chief Complaint: Postprandial epigastric pain and persistent regurgitation    History of Present Illness:  Conchita Acevedo is a 49 y.o. female who presents today for evaluation, education and consultation regarding her ongoing severe symptoms of reflux and regurgitation.  Once again she is known to me status post laparoscopic sleeve gastrectomy in 2010, laparoscopic cholecystectomy in 2011.  I then performed laparoscopic hiatal hernia repair and resection of redundant cardia of her sleeve in 2015.  She said she did well from that standpoint and no symptoms until the end of  early .  The severe heartburn pain is now somewhat better with Dexilant.  She still has regurgitation.  She says ever since her sleeve her abdomen has a loud rumbling noise that her family can hear across the room.  She says she has a high pain tolerance and of all her previous surgeries the hiatal hernia repair was the worst.  She says she could not swallow for days and had a stay in the hospital an extra day.  I had her come in today to discuss options.  Upper GI dated 2019 shows a \"very small intermittent sliding-type hiatal hernia less than 2 cm\" also noted was marketed GE reflux and a dilated esophagus to the level of the cervical esophagus.  I went over her EGD dated 2019 showing a fairly unremarkable sleeve, a tiny intermittent recurrent sliding hiatal hernia with an irregular Z line with inability to rule out short segment York's.  Z line was at 34 cm.  Pathology of the antrum was negative for H. pylori distal esophageal biopsies were consistent with reflux.  I did review Dori Patel's most recent evaluation dated 2019.  Since last seen 2019 she has gained 1-1/2 pounds.  She weighs " 180.5 pounds with a BMI of 31.5.      Past Medical History:   Diagnosis Date   • Acid reflux    • Anxiety    • Diabetes mellitus (CMS/HCC)    • MRSA infection    • PCOS (polycystic ovarian syndrome)    • Thyroid disorder      Past Surgical History:   Procedure Laterality Date   • BACK SURGERY  2015   • BLADDER SLING MODIFIED, ANTERIOR AND POSTERIOR VAGINAL REPAIR  2016   • ENDOSCOPY  12/19/2016     Dr. Jacobs, some mild diffuse gastritis,   • ENDOSCOPY  08/03/2016    Dr. Jacobs, mild gastrits and large surture at GE junction with surrounding foreign body reaction, suture was removed, no obvious HH, sleeve was fairly unremarkable. Distal esphageal bx revealsed ulcerative esophagitis with candida and gram + cocci.    • GASTRIC SLEEVE LAPAROSCOPIC  07/08/2010   • HIATAL HERNIA REPAIR  07/2015    Dr. Jacobs, [facility] partial gastectomy for outpouching of cardia   • HYSTERECTOMY  2018    vaginal, partial   • LAPAROSCOPIC CHOLECYSTECTOMY  04/13/2011    Dr. Jacobs       Allergies   Allergen Reactions   • Amoxil [Amoxicillin] GI Intolerance   • Lovenox [Enoxaparin] Rash   • Sulfa Antibiotics Rash       Current Outpatient Medications:   •  aspirin 81 MG EC tablet, Take 81 mg by mouth Daily., Disp: , Rfl:   •  dexlansoprazole (DEXILANT) 60 MG capsule, Take 1 capsule by mouth Daily., Disp: 30 capsule, Rfl: 11  •  escitalopram (LEXAPRO) 20 MG tablet, Take 20 mg by mouth Daily., Disp: , Rfl:   •  metFORMIN (GLUCOPHAGE) 1000 MG tablet, Take 1,000 mg by mouth 2 (Two) Times a Day With Meals., Disp: , Rfl:   •  Multiple Vitamin (MULTI VITAMIN PO), Take  by mouth Daily., Disp: , Rfl:   •  Thyroid 60 MG PO tablet, Take 45 mg by mouth Daily., Disp: , Rfl:     Social History     Socioeconomic History   • Marital status:      Spouse name: Not on file   • Number of children: Not on file   • Years of education: Not on file   • Highest education level: Not on file   Tobacco Use   • Smoking status: Never Smoker   • Smokeless tobacco: Never  Used   Substance and Sexual Activity   • Alcohol use: No     Frequency: Never   • Drug use: No   • Sexual activity: Defer   Social History Narrative    Lives in [place] [facility] with  and 2 daughters. Works as .      Family History   Problem Relation Age of Onset   • Diabetes Mother    • Heart disease Mother    • Kidney failure Mother    • Heart disease Father    • Stroke Father    • Diabetes Father    • Heart attack Father    • Heart disease Maternal Grandmother    • Diabetes Maternal Grandmother    • Stroke Maternal Grandmother    • Heart disease Paternal Grandmother    • Diabetes Paternal Grandmother    • Stroke Paternal Grandmother        Review of Systems   Constitutional: Positive for fatigue and unexpected weight gain. Negative for chills, diaphoresis, fever and unexpected weight loss.   HENT: Negative for congestion and facial swelling.    Eyes: Negative for blurred vision, double vision and discharge.   Respiratory: Negative for chest tightness, shortness of breath and stridor.    Cardiovascular: Negative for chest pain, palpitations and leg swelling.   Gastrointestinal: Positive for abdominal pain and GERD. Negative for blood in stool.   Endocrine: Negative for polydipsia.   Genitourinary: Negative for hematuria.   Musculoskeletal: Positive for arthralgias.   Skin: Negative for color change.   Allergic/Immunologic: Negative for immunocompromised state.   Neurological: Negative for confusion.   Psychiatric/Behavioral: Negative for self-injury.       I have reviewed the ROS and confirm that it's accurate today.    Physical Exam:  Vital Signs:  Weight: 81.9 kg (180 lb 8 oz)   Body mass index is 31.47 kg/m².  Temp: 96.2 °F (35.7 °C)   Heart Rate: 65   BP: 120/74     Physical Exam   Constitutional: She is oriented to person, place, and time. She appears well-developed and well-nourished.   HENT:   Head: Normocephalic and atraumatic.   Nose: Nose normal.   Eyes: Conjunctivae and EOM are normal.  Pupils are equal, round, and reactive to light.   Neck: Normal range of motion. Neck supple. Carotid bruit is not present. No tracheal deviation present. No thyromegaly present.   Cardiovascular: Normal rate, regular rhythm and normal heart sounds.   Pulmonary/Chest: Effort normal and breath sounds normal. No respiratory distress.   Abdominal: Soft. She exhibits no distension. There is no hepatosplenomegaly. There is no tenderness.   Upper laparoscopy scars lower midline scar   Musculoskeletal: Normal range of motion. She exhibits no edema or deformity.   Neurological: She is alert and oriented to person, place, and time. No cranial nerve deficit. Coordination normal.   Skin: Skin is warm and dry. No rash noted.   Psychiatric: She has a normal mood and affect. Her behavior is normal. Judgment and thought content normal.   Vitals reviewed.      Patient Active Problem List   Diagnosis   • Palpitations   • Chest discomfort   • Chest pain due to GERD   • Family history of early CAD   • Type 2 diabetes mellitus with complication, without long-term current use of insulin (CMS/HCC)   • Low ferritin   • Thyroid dysfunction   • Thyroid disorder   • Diabetes mellitus (CMS/HCC)   • MRSA infection   • Anxiety   • Acid reflux   • PCOS (polycystic ovarian syndrome)       Assessment:    Conchita Acevedo is a 49 y.o. year old female with severe persistent reflux regurgitation and a possible small recurrent intermittent sliding hiatal hernia status post laparoscopic sleeve gastrectomy July 2010 and laparoscopic hiatal hernia repair and resection of redundant cardia in July 2015.  Symptoms recurred at the end of 2018.  EGD and upper GI showed a small recurrent intermittent sliding hiatal hernia and distal esophageal biopsies show reflux.        Plan: We had a long discussion today concerning the risks benefits and alternative therapies and she wishes to proceed with laparoscopic recurrent hiatal hernia repair and intraoperative  evaluation of her sleeve and EGD.  The last hiatal hernia repair did alleviate her symptoms for a few years.  If the sleeve appears twisted or causing a partial obstruction then proceed with laparoscopic possible gastrojejunostomy.  She understands that the traditional recommendation would be conversion to a laparoscopic Afshan-en-Y gastric bypass and after discussion of risks benefits and alternative therapy she is not interested in this at this time.  Given her severe pain with the last operation we will plan for anesthesia to perform a tap block.  She says she can tolerate Keflex without difficulty.      Narendra Jacobs MD

## 2019-12-01 NOTE — H&P (VIEW-ONLY)
"[facility] BARIATRIC SURGERY  [address]  [place], located within [facility] [id number] [phone]      Patient  Name:  Conchita Acevedo  :  1970      Date of Visit: 19    Chief Complaint: Postprandial epigastric pain and persistent regurgitation    History of Present Illness:  Conchita Acevedo is a 49 y.o. female who presents today for evaluation, education and consultation regarding her ongoing severe symptoms of reflux and regurgitation.  Once again she is known to me status post laparoscopic sleeve gastrectomy in 2010, laparoscopic cholecystectomy in 2011.  I then performed laparoscopic hiatal hernia repair and resection of redundant cardia of her sleeve in 2015.  She said she did well from that standpoint and no symptoms until the end of  early .  The severe heartburn pain is now somewhat better with Dexilant.  She still has regurgitation.  She says ever since her sleeve her abdomen has a loud rumbling noise that her family can hear across the room.  She says she has a high pain tolerance and of all her previous surgeries the hiatal hernia repair was the worst.  She says she could not swallow for days and had a stay in the hospital an extra day.  I had her come in today to discuss options.  Upper GI dated 2019 shows a \"very small intermittent sliding-type hiatal hernia less than 2 cm\" also noted was marketed GE reflux and a dilated esophagus to the level of the cervical esophagus.  I went over her EGD dated 2019 showing a fairly unremarkable sleeve, a tiny intermittent recurrent sliding hiatal hernia with an irregular Z line with inability to rule out short segment York's.  Z line was at 34 cm.  Pathology of the antrum was negative for H. pylori distal esophageal biopsies were consistent with reflux.  I did review Dori Patel's most recent evaluation dated 2019.  Since last seen 2019 she has gained 1-1/2 pounds.  She weighs " 180.5 pounds with a BMI of 31.5.      Past Medical History:   Diagnosis Date   • Acid reflux    • Anxiety    • Diabetes mellitus (CMS/HCC)    • MRSA infection    • PCOS (polycystic ovarian syndrome)    • Thyroid disorder      Past Surgical History:   Procedure Laterality Date   • BACK SURGERY  2015   • BLADDER SLING MODIFIED, ANTERIOR AND POSTERIOR VAGINAL REPAIR  2016   • ENDOSCOPY  12/19/2016     Dr. Jacobs, some mild diffuse gastritis,   • ENDOSCOPY  08/03/2016    Dr. Jacobs, mild gastrits and large surture at GE junction with surrounding foreign body reaction, suture was removed, no obvious HH, sleeve was fairly unremarkable. Distal esphageal bx revealsed ulcerative esophagitis with candida and gram + cocci.    • GASTRIC SLEEVE LAPAROSCOPIC  07/08/2010   • HIATAL HERNIA REPAIR  07/2015    Dr. Jacobs, [facility] partial gastectomy for outpouching of cardia   • HYSTERECTOMY  2018    vaginal, partial   • LAPAROSCOPIC CHOLECYSTECTOMY  04/13/2011    Dr. Jacobs       Allergies   Allergen Reactions   • Amoxil [Amoxicillin] GI Intolerance   • Lovenox [Enoxaparin] Rash   • Sulfa Antibiotics Rash       Current Outpatient Medications:   •  aspirin 81 MG EC tablet, Take 81 mg by mouth Daily., Disp: , Rfl:   •  dexlansoprazole (DEXILANT) 60 MG capsule, Take 1 capsule by mouth Daily., Disp: 30 capsule, Rfl: 11  •  escitalopram (LEXAPRO) 20 MG tablet, Take 20 mg by mouth Daily., Disp: , Rfl:   •  metFORMIN (GLUCOPHAGE) 1000 MG tablet, Take 1,000 mg by mouth 2 (Two) Times a Day With Meals., Disp: , Rfl:   •  Multiple Vitamin (MULTI VITAMIN PO), Take  by mouth Daily., Disp: , Rfl:   •  Thyroid 60 MG PO tablet, Take 45 mg by mouth Daily., Disp: , Rfl:     Social History     Socioeconomic History   • Marital status:      Spouse name: Not on file   • Number of children: Not on file   • Years of education: Not on file   • Highest education level: Not on file   Tobacco Use   • Smoking status: Never Smoker   • Smokeless tobacco: Never  Used   Substance and Sexual Activity   • Alcohol use: No     Frequency: Never   • Drug use: No   • Sexual activity: Defer   Social History Narrative    Lives in [facility] with  and 2 daughters. Works as .      Family History   Problem Relation Age of Onset   • Diabetes Mother    • Heart disease Mother    • Kidney failure Mother    • Heart disease Father    • Stroke Father    • Diabetes Father    • Heart attack Father    • Heart disease Maternal Grandmother    • Diabetes Maternal Grandmother    • Stroke Maternal Grandmother    • Heart disease Paternal Grandmother    • Diabetes Paternal Grandmother    • Stroke Paternal Grandmother        Review of Systems   Constitutional: Positive for fatigue and unexpected weight gain. Negative for chills, diaphoresis, fever and unexpected weight loss.   HENT: Negative for congestion and facial swelling.    Eyes: Negative for blurred vision, double vision and discharge.   Respiratory: Negative for chest tightness, shortness of breath and stridor.    Cardiovascular: Negative for chest pain, palpitations and leg swelling.   Gastrointestinal: Positive for abdominal pain and GERD. Negative for blood in stool.   Endocrine: Negative for polydipsia.   Genitourinary: Negative for hematuria.   Musculoskeletal: Positive for arthralgias.   Skin: Negative for color change.   Allergic/Immunologic: Negative for immunocompromised state.   Neurological: Negative for confusion.   Psychiatric/Behavioral: Negative for self-injury.       I have reviewed the ROS and confirm that it's accurate today.    Physical Exam:  Vital Signs:  Weight: 81.9 kg (180 lb 8 oz)   Body mass index is 31.47 kg/m².  Temp: 96.2 °F (35.7 °C)   Heart Rate: 65   BP: 120/74     Physical Exam   Constitutional: She is oriented to person, place, and time. She appears well-developed and well-nourished.   HENT:   Head: Normocephalic and atraumatic.   Nose: Nose normal.   Eyes: Conjunctivae and EOM are normal.  Pupils are equal, round, and reactive to light.   Neck: Normal range of motion. Neck supple. Carotid bruit is not present. No tracheal deviation present. No thyromegaly present.   Cardiovascular: Normal rate, regular rhythm and normal heart sounds.   Pulmonary/Chest: Effort normal and breath sounds normal. No respiratory distress.   Abdominal: Soft. She exhibits no distension. There is no hepatosplenomegaly. There is no tenderness.   Upper laparoscopy scars lower midline scar   Musculoskeletal: Normal range of motion. She exhibits no edema or deformity.   Neurological: She is alert and oriented to person, place, and time. No cranial nerve deficit. Coordination normal.   Skin: Skin is warm and dry. No rash noted.   Psychiatric: She has a normal mood and affect. Her behavior is normal. Judgment and thought content normal.   Vitals reviewed.      Patient Active Problem List   Diagnosis   • Palpitations   • Chest discomfort   • Chest pain due to GERD   • Family history of early CAD   • Type 2 diabetes mellitus with complication, without long-term current use of insulin (CMS/HCC)   • Low ferritin   • Thyroid dysfunction   • Thyroid disorder   • Diabetes mellitus (CMS/HCC)   • MRSA infection   • Anxiety   • Acid reflux   • PCOS (polycystic ovarian syndrome)       Assessment:    Conchita Acevedo is a 49 y.o. year old female with severe persistent reflux regurgitation and a possible small recurrent intermittent sliding hiatal hernia status post laparoscopic sleeve gastrectomy July 2010 and laparoscopic hiatal hernia repair and resection of redundant cardia in July 2015.  Symptoms recurred at the end of 2018.  EGD and upper GI showed a small recurrent intermittent sliding hiatal hernia and distal esophageal biopsies show reflux.        Plan: We had a long discussion today concerning the risks benefits and alternative therapies and she wishes to proceed with laparoscopic recurrent hiatal hernia repair and intraoperative  evaluation of her sleeve and EGD.  The last hiatal hernia repair did alleviate her symptoms for a few years.  If the sleeve appears twisted or causing a partial obstruction then proceed with laparoscopic possible gastrojejunostomy.  She understands that the traditional recommendation would be conversion to a laparoscopic Afshan-en-Y gastric bypass and after discussion of risks benefits and alternative therapy she is not interested in this at this time.  Given her severe pain with the last operation we will plan for anesthesia to perform a tap block.  She says she can tolerate Keflex without difficulty.      Narendra Jacobs MD

## 2019-12-04 PROBLEM — K21.9 HIATAL HERNIA WITH GASTROESOPHAGEAL REFLUX: Status: ACTIVE | Noted: 2019-12-04

## 2019-12-04 PROBLEM — K31.1 PARTIAL GASTRIC OUTLET OBSTRUCTION: Status: ACTIVE | Noted: 2019-12-04

## 2019-12-04 PROBLEM — K44.9 HIATAL HERNIA WITH GASTROESOPHAGEAL REFLUX: Status: ACTIVE | Noted: 2019-12-04

## 2019-12-10 RX ORDER — DEXLANSOPRAZOLE 60 MG/1
60 CAPSULE, DELAYED RELEASE ORAL DAILY
Qty: 90 CAPSULE | Refills: 3 | Status: SHIPPED | OUTPATIENT
Start: 2019-12-10 | End: 2020-10-16

## 2019-12-18 ENCOUNTER — APPOINTMENT (OUTPATIENT)
Dept: PREADMISSION TESTING | Facility: HOSPITAL | Age: 49
End: 2019-12-18

## 2019-12-18 LAB
DEPRECATED RDW RBC AUTO: 48.3 FL (ref 37–54)
ERYTHROCYTE [DISTWIDTH] IN BLOOD BY AUTOMATED COUNT: 14.7 % (ref 12.3–15.4)
HBA1C MFR BLD: 6.9 % (ref 4.8–5.6)
HCT VFR BLD AUTO: 42.2 % (ref 34–46.6)
HGB BLD-MCNC: 13.4 G/DL (ref 12–15.9)
MCH RBC QN AUTO: 28.8 PG (ref 26.6–33)
MCHC RBC AUTO-ENTMCNC: 31.8 G/DL (ref 31.5–35.7)
MCV RBC AUTO: 90.8 FL (ref 79–97)
PLATELET # BLD AUTO: 332 10*3/MM3 (ref 140–450)
PMV BLD AUTO: 10 FL (ref 6–12)
POTASSIUM BLD-SCNC: 4.7 MMOL/L (ref 3.5–5.2)
RBC # BLD AUTO: 4.65 10*6/MM3 (ref 3.77–5.28)
WBC NRBC COR # BLD: 9.6 10*3/MM3 (ref 3.4–10.8)

## 2019-12-18 PROCEDURE — 84132 ASSAY OF SERUM POTASSIUM: CPT | Performed by: ANESTHESIOLOGY

## 2019-12-18 PROCEDURE — 85027 COMPLETE CBC AUTOMATED: CPT | Performed by: ANESTHESIOLOGY

## 2019-12-18 PROCEDURE — 36415 COLL VENOUS BLD VENIPUNCTURE: CPT

## 2019-12-18 PROCEDURE — 83036 HEMOGLOBIN GLYCOSYLATED A1C: CPT | Performed by: ANESTHESIOLOGY

## 2019-12-18 NOTE — PAT
ekg and clearance on chart from dr martinez.  The following information and instructions were given:    Do not eat, drink, smoke or chew gum after midnight the night before surgery. This also includes no mints.  Take all routine, prescribed medications including heart and blood pressure medicines with a sip of water unless otherwise instructed by your physician.   Do NOT take diabetic medication unless instructed by your physician.    If you were instructed to drink 20 ounces (or until full) of Gatorade the morning of surgery, the Gatorade must be completed 1 hour before arrival time on the day of surgery .  No RED Gatorade.      DO NOT shave for two days before your procedure.  Do not wear makeup.      DO NOT wear fingernail polish (gel/regular) and/or acrylic/artificial nails on the day of surgery.   If you have had a recent manicure and would rather not remove polish or artificial nails, then the minimum requirement is that the polish/artificial nails must be removed from the middle finger on each hand.      If you are having surgery/procedure on an upper extremity, then fingernail polish/artificial fingernails must be removed for surgery.  NO EXCEPTIONS.      If you are having surgery/procedure on a lower extremity, then toenail polish on both extremities must be removed for surgery.  NO EXCEPTIONS.    Remove all jewelry (advise to go to jeweler if unable to remove).  Jewelry, especially rings, can no longer be taped for surgery.    Leave anything you consider valuable at home.    Leave your suitcase in the car until after your surgery.    Bring the following with you the day of your procedure (when applicable)       -picture ID and insurance cards       -Co-pay/deductible required by insurance       -Medications in the original bottles (not a list) including all over-the-counter medications if not brought to PAT       -Copy of advance directive, living will or power of  documents if not brought to PAT        -CPAP or BIPAP mask and tubing (do not bring machine)       -Skin prep instruction(s) sheet       -PAT Pass    Education booklet, brochure, handout or binder related to procedure given to patient.    When applicable, an ERAS booklet was given to patient.    Pain Control After Surgery handout given to patient.    Respirex use (handout given to patient) and pneumonia prevention education provided.    Signs and Symptoms of infection discussed.    DVT Prevention education given.  Stressing the importance of ambulation.    Please apply Chlorhexadine wipes to surgical area (if instructed) the night before procedure and the AM of procedure and document date/time of applications on skin prep instruction sheet.

## 2019-12-18 NOTE — DISCHARGE INSTRUCTIONS
The following information and instructions were given:    Do not eat, drink, smoke or chew gum after midnight the night before surgery. This also includes no mints.  Take all routine, prescribed medications including heart and blood pressure medicines with a sip of water unless otherwise instructed by your physician.   Do NOT take diabetic medication unless instructed by your physician.    If you were instructed to drink 20 ounces (or until full) of Gatorade the morning of surgery, the Gatorade must be completed 1 hour before arrival time on the day of surgery .  No RED Gatorade.      DO NOT shave for two days before your procedure.  Do not wear makeup.      DO NOT wear fingernail polish (gel/regular) and/or acrylic/artificial nails on the day of surgery.   If you have had a recent manicure and would rather not remove polish or artificial nails, then the minimum requirement is that the polish/artificial nails must be removed from the middle finger on each hand.      If you are having surgery/procedure on an upper extremity, then fingernail polish/artificial fingernails must be removed for surgery.  NO EXCEPTIONS.      If you are having surgery/procedure on a lower extremity, then toenail polish on both extremities must be removed for surgery.  NO EXCEPTIONS.    Remove all jewelry (advise to go to jeweler if unable to remove).  Jewelry, especially rings, can no longer be taped for surgery.    Leave anything you consider valuable at home.    Leave your suitcase in the car until after your surgery.    Bring the following with you the day of your procedure (when applicable)       -picture ID and insurance cards       -Co-pay/deductible required by insurance       -Medications in the original bottles (not a list) including all over-the-counter medications if not brought to PAT       -Copy of advance directive, living will or power of  documents if not brought to PAT       -CPAP or BIPAP mask and tubing (do not  bring machine)       -Skin prep instruction(s) sheet       -PAT Pass    Education booklet, brochure, handout or binder related to procedure given to patient.    When applicable, an ERAS booklet was given to patient.    Pain Control After Surgery handout given to patient.    Respirex use (handout given to patient) and pneumonia prevention education provided.    Signs and Symptoms of infection discussed.    DVT Prevention education given.  Stressing the importance of ambulation.    Please apply Chlorhexadine wipes to surgical area (if instructed) the night before procedure and the AM of procedure and document date/time of applications on skin prep instruction sheet.        Hernia and Bariatric instruction given

## 2019-12-19 ENCOUNTER — ANESTHESIA (OUTPATIENT)
Dept: PERIOP | Facility: HOSPITAL | Age: 49
End: 2019-12-19

## 2019-12-19 ENCOUNTER — HOSPITAL ENCOUNTER (INPATIENT)
Facility: HOSPITAL | Age: 49
LOS: 1 days | Discharge: HOME | End: 2019-12-20
Attending: SURGERY | Admitting: SURGERY

## 2019-12-19 ENCOUNTER — ANESTHESIA EVENT (OUTPATIENT)
Dept: PERIOP | Facility: HOSPITAL | Age: 49
End: 2019-12-19

## 2019-12-19 DIAGNOSIS — K31.1 PARTIAL GASTRIC OUTLET OBSTRUCTION: ICD-10-CM

## 2019-12-19 DIAGNOSIS — K44.9 HIATAL HERNIA WITH GASTROESOPHAGEAL REFLUX: ICD-10-CM

## 2019-12-19 DIAGNOSIS — K21.9 HIATAL HERNIA WITH GASTROESOPHAGEAL REFLUX: ICD-10-CM

## 2019-12-19 LAB
GLUCOSE BLDC GLUCOMTR-MCNC: 117 MG/DL (ref 70–130)
GLUCOSE BLDC GLUCOMTR-MCNC: 192 MG/DL (ref 70–130)
GLUCOSE BLDC GLUCOMTR-MCNC: 218 MG/DL (ref 70–130)

## 2019-12-19 PROCEDURE — 63710000001 INSULIN LISPRO (HUMAN) PER 5 UNITS: Performed by: SURGERY

## 2019-12-19 PROCEDURE — 25010000002 ONDANSETRON PER 1 MG: Performed by: NURSE ANESTHETIST, CERTIFIED REGISTERED

## 2019-12-19 PROCEDURE — 25010000002 HEPARIN (PORCINE) PER 1000 UNITS: Performed by: SURGERY

## 2019-12-19 PROCEDURE — 25010000002 BUPRENORPHINE PER 0.1 MG: Performed by: NURSE ANESTHETIST, CERTIFIED REGISTERED

## 2019-12-19 PROCEDURE — 25010000002 FENTANYL CITRATE (PF) 100 MCG/2ML SOLUTION: Performed by: NURSE ANESTHETIST, CERTIFIED REGISTERED

## 2019-12-19 PROCEDURE — 25010000002 CEFAZOLIN PER 500 MG: Performed by: SURGERY

## 2019-12-19 PROCEDURE — 94799 UNLISTED PULMONARY SVC/PX: CPT

## 2019-12-19 PROCEDURE — 0BQT4ZZ REPAIR DIAPHRAGM, PERCUTANEOUS ENDOSCOPIC APPROACH: ICD-10-PCS | Performed by: SURGERY

## 2019-12-19 PROCEDURE — 0D164ZA BYPASS STOMACH TO JEJUNUM, PERCUTANEOUS ENDOSCOPIC APPROACH: ICD-10-PCS | Performed by: SURGERY

## 2019-12-19 PROCEDURE — 25010000003 CEFAZOLIN IN DEXTROSE 2-4 GM/100ML-% SOLUTION: Performed by: SURGERY

## 2019-12-19 PROCEDURE — 25010000002 ONDANSETRON PER 1 MG: Performed by: SURGERY

## 2019-12-19 PROCEDURE — 43644 LAP GASTRIC BYPASS/ROUX-EN-Y: CPT | Performed by: SURGERY

## 2019-12-19 PROCEDURE — 25010000002 DEXAMETHASONE SODIUM PHOSPHATE 10 MG/ML SOLUTION: Performed by: NURSE ANESTHETIST, CERTIFIED REGISTERED

## 2019-12-19 PROCEDURE — 25010000002 SUCCINYLCHOLINE PER 20 MG: Performed by: NURSE ANESTHETIST, CERTIFIED REGISTERED

## 2019-12-19 PROCEDURE — 25010000002 NEOSTIGMINE 10 MG/10ML SOLUTION: Performed by: NURSE ANESTHETIST, CERTIFIED REGISTERED

## 2019-12-19 PROCEDURE — 25010000002 PROPOFOL 10 MG/ML EMULSION: Performed by: NURSE ANESTHETIST, CERTIFIED REGISTERED

## 2019-12-19 PROCEDURE — 0DJ08ZZ INSPECTION OF UPPER INTESTINAL TRACT, VIA NATURAL OR ARTIFICIAL OPENING ENDOSCOPIC: ICD-10-PCS | Performed by: SURGERY

## 2019-12-19 PROCEDURE — 82962 GLUCOSE BLOOD TEST: CPT

## 2019-12-19 PROCEDURE — 25010000003 LIDOCAINE 1 % SOLUTION: Performed by: NURSE ANESTHETIST, CERTIFIED REGISTERED

## 2019-12-19 DEVICE — ENDOPATH ECHELON ENDOSCOPIC LINEAR CUTTER RELOADS, BLUE, 60MM
Type: IMPLANTABLE DEVICE | Status: FUNCTIONAL
Brand: ECHELON ENDOPATH

## 2019-12-19 DEVICE — STPL/LN REINF PERISTRIP DRY VERITAS THN: Type: IMPLANTABLE DEVICE | Status: FUNCTIONAL

## 2019-12-19 DEVICE — ENDOPATH ECHELON ENDOSCOPIC LINEAR CUTTER RELOADS, GREEN, 60MM
Type: IMPLANTABLE DEVICE | Status: FUNCTIONAL
Brand: ECHELON ENDOPATH

## 2019-12-19 DEVICE — STPL/LN REINF PERISTRIP DRY VERITAS GEL: Type: IMPLANTABLE DEVICE | Status: FUNCTIONAL

## 2019-12-19 RX ORDER — SODIUM CHLORIDE, SODIUM LACTATE, POTASSIUM CHLORIDE, CALCIUM CHLORIDE 600; 310; 30; 20 MG/100ML; MG/100ML; MG/100ML; MG/100ML
9 INJECTION, SOLUTION INTRAVENOUS CONTINUOUS
Status: DISCONTINUED | OUTPATIENT
Start: 2019-12-19 | End: 2019-12-19 | Stop reason: HOSPADM

## 2019-12-19 RX ORDER — SODIUM CHLORIDE 0.9 % (FLUSH) 0.9 %
10 SYRINGE (ML) INJECTION EVERY 12 HOURS SCHEDULED
Status: CANCELLED | OUTPATIENT
Start: 2019-12-19

## 2019-12-19 RX ORDER — PROMETHAZINE HYDROCHLORIDE 12.5 MG/1
12.5 TABLET ORAL EVERY 6 HOURS PRN
Status: DISCONTINUED | OUTPATIENT
Start: 2019-12-19 | End: 2019-12-20 | Stop reason: HOSPADM

## 2019-12-19 RX ORDER — ALBUTEROL SULFATE 2.5 MG/3ML
2.5 SOLUTION RESPIRATORY (INHALATION) EVERY 4 HOURS PRN
Status: DISCONTINUED | OUTPATIENT
Start: 2019-12-19 | End: 2019-12-20 | Stop reason: HOSPADM

## 2019-12-19 RX ORDER — NALOXONE HCL 0.4 MG/ML
0.1 VIAL (ML) INJECTION
Status: DISCONTINUED | OUTPATIENT
Start: 2019-12-19 | End: 2019-12-20 | Stop reason: HOSPADM

## 2019-12-19 RX ORDER — ESCITALOPRAM OXALATE 20 MG/1
20 TABLET ORAL DAILY
Status: DISCONTINUED | OUTPATIENT
Start: 2019-12-19 | End: 2019-12-20 | Stop reason: HOSPADM

## 2019-12-19 RX ORDER — HYDROMORPHONE HYDROCHLORIDE 2 MG/1
2 TABLET ORAL EVERY 4 HOURS PRN
Status: DISCONTINUED | OUTPATIENT
Start: 2019-12-19 | End: 2019-12-20 | Stop reason: HOSPADM

## 2019-12-19 RX ORDER — FENTANYL CITRATE 50 UG/ML
INJECTION, SOLUTION INTRAMUSCULAR; INTRAVENOUS AS NEEDED
Status: DISCONTINUED | OUTPATIENT
Start: 2019-12-19 | End: 2019-12-19 | Stop reason: SURG

## 2019-12-19 RX ORDER — HYDROCODONE BITARTRATE AND ACETAMINOPHEN 7.5; 325 MG/1; MG/1
1 TABLET ORAL EVERY 4 HOURS PRN
Status: DISCONTINUED | OUTPATIENT
Start: 2019-12-19 | End: 2019-12-20 | Stop reason: HOSPADM

## 2019-12-19 RX ORDER — MAGNESIUM HYDROXIDE 1200 MG/15ML
LIQUID ORAL AS NEEDED
Status: DISCONTINUED | OUTPATIENT
Start: 2019-12-19 | End: 2019-12-19 | Stop reason: HOSPADM

## 2019-12-19 RX ORDER — ACETAMINOPHEN 500 MG
1000 TABLET ORAL EVERY 12 HOURS
Status: DISCONTINUED | OUTPATIENT
Start: 2019-12-19 | End: 2019-12-20 | Stop reason: HOSPADM

## 2019-12-19 RX ORDER — PROMETHAZINE HYDROCHLORIDE 25 MG/1
25 SUPPOSITORY RECTAL ONCE AS NEEDED
Status: DISCONTINUED | OUTPATIENT
Start: 2019-12-19 | End: 2019-12-19 | Stop reason: HOSPADM

## 2019-12-19 RX ORDER — HEPARIN SODIUM 5000 [USP'U]/ML
5000 INJECTION, SOLUTION INTRAVENOUS; SUBCUTANEOUS EVERY 8 HOURS SCHEDULED
Status: DISCONTINUED | OUTPATIENT
Start: 2019-12-20 | End: 2019-12-20 | Stop reason: HOSPADM

## 2019-12-19 RX ORDER — PANTOPRAZOLE SODIUM 40 MG/10ML
40 INJECTION, POWDER, LYOPHILIZED, FOR SOLUTION INTRAVENOUS ONCE
Status: COMPLETED | OUTPATIENT
Start: 2019-12-19 | End: 2019-12-19

## 2019-12-19 RX ORDER — HEPARIN SODIUM 5000 [USP'U]/ML
INJECTION, SOLUTION INTRAVENOUS; SUBCUTANEOUS AS NEEDED
Status: DISCONTINUED | OUTPATIENT
Start: 2019-12-19 | End: 2019-12-20 | Stop reason: HOSPADM

## 2019-12-19 RX ORDER — SODIUM CHLORIDE 0.9 % (FLUSH) 0.9 %
10 SYRINGE (ML) INJECTION AS NEEDED
Status: CANCELLED | OUTPATIENT
Start: 2019-12-19

## 2019-12-19 RX ORDER — LORAZEPAM 1 MG/1
1 TABLET ORAL EVERY 12 HOURS PRN
Status: DISCONTINUED | OUTPATIENT
Start: 2019-12-19 | End: 2019-12-20 | Stop reason: HOSPADM

## 2019-12-19 RX ORDER — CEFAZOLIN SODIUM 2 G/100ML
2 INJECTION, SOLUTION INTRAVENOUS ONCE
Status: COMPLETED | OUTPATIENT
Start: 2019-12-19 | End: 2019-12-19

## 2019-12-19 RX ORDER — SCOLOPAMINE TRANSDERMAL SYSTEM 1 MG/1
1 PATCH, EXTENDED RELEASE TRANSDERMAL ONCE
Status: DISCONTINUED | OUTPATIENT
Start: 2019-12-19 | End: 2019-12-19

## 2019-12-19 RX ORDER — CEFAZOLIN SODIUM 2 G/100ML
2 INJECTION, SOLUTION INTRAVENOUS EVERY 8 HOURS
Status: COMPLETED | OUTPATIENT
Start: 2019-12-19 | End: 2019-12-20

## 2019-12-19 RX ORDER — SODIUM CHLORIDE AND POTASSIUM CHLORIDE 150; 450 MG/100ML; MG/100ML
125 INJECTION, SOLUTION INTRAVENOUS CONTINUOUS
Status: DISCONTINUED | OUTPATIENT
Start: 2019-12-20 | End: 2019-12-20 | Stop reason: HOSPADM

## 2019-12-19 RX ORDER — LEVOTHYROXINE AND LIOTHYRONINE 19; 4.5 UG/1; UG/1
45 TABLET ORAL DAILY
Status: DISCONTINUED | OUTPATIENT
Start: 2019-12-20 | End: 2019-12-20 | Stop reason: HOSPADM

## 2019-12-19 RX ORDER — ACETAMINOPHEN 500 MG
1000 TABLET ORAL ONCE
Status: COMPLETED | OUTPATIENT
Start: 2019-12-19 | End: 2019-12-19

## 2019-12-19 RX ORDER — ATRACURIUM BESYLATE 10 MG/ML
INJECTION, SOLUTION INTRAVENOUS AS NEEDED
Status: DISCONTINUED | OUTPATIENT
Start: 2019-12-19 | End: 2019-12-19 | Stop reason: SURG

## 2019-12-19 RX ORDER — HYDROMORPHONE HYDROCHLORIDE 1 MG/ML
0.25 INJECTION, SOLUTION INTRAMUSCULAR; INTRAVENOUS; SUBCUTANEOUS
Status: DISCONTINUED | OUTPATIENT
Start: 2019-12-19 | End: 2019-12-19 | Stop reason: HOSPADM

## 2019-12-19 RX ORDER — CHLORHEXIDINE GLUCONATE 0.12 MG/ML
30 RINSE ORAL
Status: COMPLETED | OUTPATIENT
Start: 2019-12-19 | End: 2019-12-19

## 2019-12-19 RX ORDER — PROMETHAZINE HYDROCHLORIDE 25 MG/ML
12.5 INJECTION, SOLUTION INTRAMUSCULAR; INTRAVENOUS EVERY 6 HOURS PRN
Status: DISCONTINUED | OUTPATIENT
Start: 2019-12-19 | End: 2019-12-20 | Stop reason: HOSPADM

## 2019-12-19 RX ORDER — LIDOCAINE HYDROCHLORIDE 10 MG/ML
0.5 INJECTION, SOLUTION EPIDURAL; INFILTRATION; INTRACAUDAL; PERINEURAL ONCE AS NEEDED
Status: COMPLETED | OUTPATIENT
Start: 2019-12-19 | End: 2019-12-19

## 2019-12-19 RX ORDER — FAMOTIDINE 10 MG/ML
20 INJECTION, SOLUTION INTRAVENOUS ONCE
Status: CANCELLED | OUTPATIENT
Start: 2019-12-19 | End: 2019-12-19

## 2019-12-19 RX ORDER — PROCHLORPERAZINE MALEATE 10 MG
10 TABLET ORAL EVERY 6 HOURS PRN
Status: DISCONTINUED | OUTPATIENT
Start: 2019-12-19 | End: 2019-12-20 | Stop reason: HOSPADM

## 2019-12-19 RX ORDER — PANTOPRAZOLE SODIUM 40 MG/10ML
40 INJECTION, POWDER, LYOPHILIZED, FOR SOLUTION INTRAVENOUS
Status: DISCONTINUED | OUTPATIENT
Start: 2019-12-20 | End: 2019-12-20 | Stop reason: HOSPADM

## 2019-12-19 RX ORDER — ONDANSETRON 2 MG/ML
4 INJECTION INTRAMUSCULAR; INTRAVENOUS ONCE AS NEEDED
Status: DISCONTINUED | OUTPATIENT
Start: 2019-12-19 | End: 2019-12-19 | Stop reason: HOSPADM

## 2019-12-19 RX ORDER — PROMETHAZINE HYDROCHLORIDE 25 MG/1
25 TABLET ORAL ONCE AS NEEDED
Status: DISCONTINUED | OUTPATIENT
Start: 2019-12-19 | End: 2019-12-19 | Stop reason: HOSPADM

## 2019-12-19 RX ORDER — HYDROMORPHONE HYDROCHLORIDE 1 MG/ML
0.5 INJECTION, SOLUTION INTRAMUSCULAR; INTRAVENOUS; SUBCUTANEOUS
Status: DISCONTINUED | OUTPATIENT
Start: 2019-12-19 | End: 2019-12-19 | Stop reason: HOSPADM

## 2019-12-19 RX ORDER — SODIUM CHLORIDE 9 MG/ML
125 INJECTION, SOLUTION INTRAVENOUS CONTINUOUS
Status: DISCONTINUED | OUTPATIENT
Start: 2019-12-19 | End: 2019-12-20 | Stop reason: HOSPADM

## 2019-12-19 RX ORDER — FENTANYL CITRATE 50 UG/ML
50 INJECTION, SOLUTION INTRAMUSCULAR; INTRAVENOUS
Status: DISCONTINUED | OUTPATIENT
Start: 2019-12-19 | End: 2019-12-19 | Stop reason: HOSPADM

## 2019-12-19 RX ORDER — GABAPENTIN 300 MG/1
600 CAPSULE ORAL ONCE
Status: COMPLETED | OUTPATIENT
Start: 2019-12-19 | End: 2019-12-19

## 2019-12-19 RX ORDER — GABAPENTIN 100 MG/1
100 CAPSULE ORAL 3 TIMES DAILY
Status: DISCONTINUED | OUTPATIENT
Start: 2019-12-19 | End: 2019-12-20 | Stop reason: HOSPADM

## 2019-12-19 RX ORDER — ONDANSETRON 2 MG/ML
4 INJECTION INTRAMUSCULAR; INTRAVENOUS EVERY 6 HOURS
Status: COMPLETED | OUTPATIENT
Start: 2019-12-19 | End: 2019-12-20

## 2019-12-19 RX ORDER — DIPHENHYDRAMINE HYDROCHLORIDE 50 MG/ML
25 INJECTION INTRAMUSCULAR; INTRAVENOUS EVERY 4 HOURS PRN
Status: DISCONTINUED | OUTPATIENT
Start: 2019-12-19 | End: 2019-12-20 | Stop reason: HOSPADM

## 2019-12-19 RX ORDER — PROPOFOL 10 MG/ML
VIAL (ML) INTRAVENOUS AS NEEDED
Status: DISCONTINUED | OUTPATIENT
Start: 2019-12-19 | End: 2019-12-19 | Stop reason: SURG

## 2019-12-19 RX ORDER — GLYCOPYRROLATE 0.2 MG/ML
INJECTION INTRAMUSCULAR; INTRAVENOUS AS NEEDED
Status: DISCONTINUED | OUTPATIENT
Start: 2019-12-19 | End: 2019-12-19 | Stop reason: SURG

## 2019-12-19 RX ORDER — SIMETHICONE 80 MG
80 TABLET,CHEWABLE ORAL 4 TIMES DAILY PRN
Status: DISCONTINUED | OUTPATIENT
Start: 2019-12-19 | End: 2019-12-20 | Stop reason: HOSPADM

## 2019-12-19 RX ORDER — DEXAMETHASONE SODIUM PHOSPHATE 10 MG/ML
INJECTION, SOLUTION INTRAMUSCULAR; INTRAVENOUS
Status: COMPLETED | OUTPATIENT
Start: 2019-12-19 | End: 2019-12-19

## 2019-12-19 RX ORDER — ONDANSETRON 4 MG/1
4 TABLET, FILM COATED ORAL EVERY 6 HOURS PRN
Status: DISCONTINUED | OUTPATIENT
Start: 2019-12-20 | End: 2019-12-20 | Stop reason: HOSPADM

## 2019-12-19 RX ORDER — METOCLOPRAMIDE HYDROCHLORIDE 5 MG/ML
10 INJECTION INTRAMUSCULAR; INTRAVENOUS EVERY 6 HOURS PRN
Status: DISCONTINUED | OUTPATIENT
Start: 2019-12-19 | End: 2019-12-20 | Stop reason: HOSPADM

## 2019-12-19 RX ORDER — MORPHINE SULFATE 4 MG/ML
4 INJECTION, SOLUTION INTRAMUSCULAR; INTRAVENOUS
Status: DISCONTINUED | OUTPATIENT
Start: 2019-12-19 | End: 2019-12-20 | Stop reason: HOSPADM

## 2019-12-19 RX ORDER — PROMETHAZINE HYDROCHLORIDE 25 MG/ML
12.5 INJECTION, SOLUTION INTRAMUSCULAR; INTRAVENOUS ONCE AS NEEDED
Status: DISCONTINUED | OUTPATIENT
Start: 2019-12-19 | End: 2019-12-19 | Stop reason: HOSPADM

## 2019-12-19 RX ORDER — FAMOTIDINE 20 MG/1
20 TABLET, FILM COATED ORAL ONCE
Status: CANCELLED | OUTPATIENT
Start: 2019-12-19 | End: 2019-12-19

## 2019-12-19 RX ORDER — LIDOCAINE HYDROCHLORIDE 10 MG/ML
INJECTION, SOLUTION INFILTRATION; PERINEURAL AS NEEDED
Status: DISCONTINUED | OUTPATIENT
Start: 2019-12-19 | End: 2019-12-19 | Stop reason: SURG

## 2019-12-19 RX ORDER — ONDANSETRON 2 MG/ML
INJECTION INTRAMUSCULAR; INTRAVENOUS AS NEEDED
Status: DISCONTINUED | OUTPATIENT
Start: 2019-12-19 | End: 2019-12-19 | Stop reason: SURG

## 2019-12-19 RX ORDER — NEOSTIGMINE METHYLSULFATE 1 MG/ML
INJECTION, SOLUTION INTRAVENOUS AS NEEDED
Status: DISCONTINUED | OUTPATIENT
Start: 2019-12-19 | End: 2019-12-19 | Stop reason: SURG

## 2019-12-19 RX ORDER — NALOXONE HCL 0.4 MG/ML
0.4 VIAL (ML) INJECTION
Status: DISCONTINUED | OUTPATIENT
Start: 2019-12-19 | End: 2019-12-20 | Stop reason: HOSPADM

## 2019-12-19 RX ORDER — BUPRENORPHINE HYDROCHLORIDE 0.32 MG/ML
INJECTION INTRAMUSCULAR; INTRAVENOUS
Status: COMPLETED | OUTPATIENT
Start: 2019-12-19 | End: 2019-12-19

## 2019-12-19 RX ORDER — CYANOCOBALAMIN 1000 UG/ML
1000 INJECTION, SOLUTION INTRAMUSCULAR; SUBCUTANEOUS ONCE
Status: COMPLETED | OUTPATIENT
Start: 2019-12-20 | End: 2019-12-20

## 2019-12-19 RX ORDER — BUPIVACAINE HYDROCHLORIDE 2.5 MG/ML
INJECTION, SOLUTION EPIDURAL; INFILTRATION; INTRACAUDAL
Status: COMPLETED | OUTPATIENT
Start: 2019-12-19 | End: 2019-12-19

## 2019-12-19 RX ORDER — SUCCINYLCHOLINE CHLORIDE 20 MG/ML
INJECTION INTRAMUSCULAR; INTRAVENOUS AS NEEDED
Status: DISCONTINUED | OUTPATIENT
Start: 2019-12-19 | End: 2019-12-19 | Stop reason: SURG

## 2019-12-19 RX ORDER — HYDRALAZINE HYDROCHLORIDE 20 MG/ML
10 INJECTION INTRAMUSCULAR; INTRAVENOUS
Status: DISCONTINUED | OUTPATIENT
Start: 2019-12-19 | End: 2019-12-20 | Stop reason: HOSPADM

## 2019-12-19 RX ORDER — SODIUM CHLORIDE 9 MG/ML
150 INJECTION, SOLUTION INTRAVENOUS CONTINUOUS
Status: DISCONTINUED | OUTPATIENT
Start: 2019-12-19 | End: 2019-12-19 | Stop reason: HOSPADM

## 2019-12-19 RX ORDER — ALPRAZOLAM 0.25 MG/1
0.25 TABLET ORAL ONCE AS NEEDED
Status: DISCONTINUED | OUTPATIENT
Start: 2019-12-19 | End: 2019-12-20 | Stop reason: HOSPADM

## 2019-12-19 RX ORDER — LORAZEPAM 2 MG/ML
0.5 INJECTION INTRAMUSCULAR EVERY 12 HOURS PRN
Status: DISCONTINUED | OUTPATIENT
Start: 2019-12-19 | End: 2019-12-20 | Stop reason: HOSPADM

## 2019-12-19 RX ORDER — HEPARIN SODIUM 5000 [USP'U]/ML
5000 INJECTION, SOLUTION INTRAVENOUS; SUBCUTANEOUS EVERY 8 HOURS SCHEDULED
Status: DISCONTINUED | OUTPATIENT
Start: 2019-12-19 | End: 2019-12-19 | Stop reason: SDUPTHER

## 2019-12-19 RX ADMIN — BUPIVACAINE HYDROCHLORIDE 60 ML: 2.5 INJECTION, SOLUTION EPIDURAL; INFILTRATION; INTRACAUDAL; PERINEURAL at 09:05

## 2019-12-19 RX ADMIN — EPHEDRINE SULFATE 10 MG: 50 INJECTION INTRAMUSCULAR; INTRAVENOUS; SUBCUTANEOUS at 09:14

## 2019-12-19 RX ADMIN — PANTOPRAZOLE SODIUM 40 MG: 40 INJECTION, POWDER, FOR SOLUTION INTRAVENOUS at 08:10

## 2019-12-19 RX ADMIN — SCOPALAMINE 1 PATCH: 1 PATCH, EXTENDED RELEASE TRANSDERMAL at 08:08

## 2019-12-19 RX ADMIN — CHLORHEXIDINE GLUCONATE 0.12% ORAL RINSE 30 ML: 1.2 LIQUID ORAL at 08:09

## 2019-12-19 RX ADMIN — SODIUM CHLORIDE: 9 INJECTION, SOLUTION INTRAVENOUS at 11:52

## 2019-12-19 RX ADMIN — PROPOFOL 160 MG: 10 INJECTION, EMULSION INTRAVENOUS at 08:58

## 2019-12-19 RX ADMIN — ACETAMINOPHEN 1000 MG: 500 TABLET ORAL at 08:09

## 2019-12-19 RX ADMIN — SUCCINYLCHOLINE CHLORIDE 120 MG: 20 INJECTION, SOLUTION INTRAMUSCULAR; INTRAVENOUS; PARENTERAL at 08:58

## 2019-12-19 RX ADMIN — BUPRENORPHINE HYDROCHLORIDE 0.3 MG: 0.32 INJECTION INTRAMUSCULAR; INTRAVENOUS at 09:05

## 2019-12-19 RX ADMIN — ESCITALOPRAM OXALATE 20 MG: 20 TABLET ORAL at 16:42

## 2019-12-19 RX ADMIN — DEXAMETHASONE SODIUM PHOSPHATE 4 MG: 10 INJECTION INTRAMUSCULAR; INTRAVENOUS at 09:05

## 2019-12-19 RX ADMIN — SODIUM CHLORIDE 125 ML/HR: 9 INJECTION, SOLUTION INTRAVENOUS at 14:43

## 2019-12-19 RX ADMIN — ATRACURIUM BESYLATE 10 MG: 10 INJECTION, SOLUTION INTRAVENOUS at 10:10

## 2019-12-19 RX ADMIN — INSULIN LISPRO 2 UNITS: 100 INJECTION, SOLUTION INTRAVENOUS; SUBCUTANEOUS at 21:10

## 2019-12-19 RX ADMIN — SODIUM CHLORIDE: 9 INJECTION, SOLUTION INTRAVENOUS at 08:54

## 2019-12-19 RX ADMIN — GABAPENTIN 600 MG: 300 CAPSULE ORAL at 08:09

## 2019-12-19 RX ADMIN — ATRACURIUM BESYLATE 10 MG: 10 INJECTION, SOLUTION INTRAVENOUS at 10:36

## 2019-12-19 RX ADMIN — ATRACURIUM BESYLATE 10 MG: 10 INJECTION, SOLUTION INTRAVENOUS at 11:16

## 2019-12-19 RX ADMIN — METFORMIN HYDROCHLORIDE 1000 MG: 1000 TABLET ORAL at 21:09

## 2019-12-19 RX ADMIN — DEXAMETHASONE SODIUM PHOSPHATE 6 MG: 10 INJECTION INTRAMUSCULAR; INTRAVENOUS at 09:12

## 2019-12-19 RX ADMIN — GLYCOPYRROLATE 0.2 MG: 0.2 INJECTION, SOLUTION INTRAMUSCULAR; INTRAVENOUS at 09:19

## 2019-12-19 RX ADMIN — DEXTROSE MONOHYDRATE 2 G: 50 INJECTION, SOLUTION INTRAVENOUS at 16:42

## 2019-12-19 RX ADMIN — ONDANSETRON 4 MG: 2 INJECTION INTRAMUSCULAR; INTRAVENOUS at 18:45

## 2019-12-19 RX ADMIN — CHLORHEXIDINE GLUCONATE 0.12% ORAL RINSE 30 ML: 1.2 LIQUID ORAL at 08:12

## 2019-12-19 RX ADMIN — GLYCOPYRROLATE 0.4 MG: 0.2 INJECTION, SOLUTION INTRAMUSCULAR; INTRAVENOUS at 12:06

## 2019-12-19 RX ADMIN — SODIUM CHLORIDE 125 ML/HR: 9 INJECTION, SOLUTION INTRAVENOUS at 21:13

## 2019-12-19 RX ADMIN — ATRACURIUM BESYLATE 35 MG: 10 INJECTION, SOLUTION INTRAVENOUS at 09:04

## 2019-12-19 RX ADMIN — GABAPENTIN 100 MG: 100 CAPSULE ORAL at 21:09

## 2019-12-19 RX ADMIN — PROPOFOL 25 MCG/KG/MIN: 10 INJECTION, EMULSION INTRAVENOUS at 09:00

## 2019-12-19 RX ADMIN — ACETAMINOPHEN 1000 MG: 500 TABLET, FILM COATED ORAL at 21:09

## 2019-12-19 RX ADMIN — ONDANSETRON 4 MG: 2 INJECTION INTRAMUSCULAR; INTRAVENOUS at 11:45

## 2019-12-19 RX ADMIN — GABAPENTIN 100 MG: 100 CAPSULE ORAL at 16:42

## 2019-12-19 RX ADMIN — CEFAZOLIN SODIUM 2 G: 2 INJECTION, SOLUTION INTRAVENOUS at 08:54

## 2019-12-19 RX ADMIN — SODIUM CHLORIDE 1000 ML: 9 INJECTION, SOLUTION INTRAVENOUS at 08:11

## 2019-12-19 RX ADMIN — FENTANYL CITRATE 50 MCG: 50 INJECTION, SOLUTION INTRAMUSCULAR; INTRAVENOUS at 08:58

## 2019-12-19 RX ADMIN — INSULIN LISPRO 3 UNITS: 100 INJECTION, SOLUTION INTRAVENOUS; SUBCUTANEOUS at 18:44

## 2019-12-19 RX ADMIN — ATRACURIUM BESYLATE 5 MG: 10 INJECTION, SOLUTION INTRAVENOUS at 08:58

## 2019-12-19 RX ADMIN — LIDOCAINE HYDROCHLORIDE 0.5 ML: 10 INJECTION, SOLUTION EPIDURAL; INFILTRATION; INTRACAUDAL; PERINEURAL at 08:11

## 2019-12-19 RX ADMIN — LIDOCAINE HYDROCHLORIDE 50 MG: 10 INJECTION, SOLUTION INFILTRATION; PERINEURAL at 08:58

## 2019-12-19 RX ADMIN — NEOSTIGMINE METHYLSULFATE 2 MG: 1 INJECTION, SOLUTION INTRAVENOUS at 12:06

## 2019-12-19 RX ADMIN — ONDANSETRON 4 MG: 2 INJECTION INTRAMUSCULAR; INTRAVENOUS at 14:42

## 2019-12-19 RX ADMIN — FENTANYL CITRATE 50 MCG: 50 INJECTION, SOLUTION INTRAMUSCULAR; INTRAVENOUS at 12:02

## 2019-12-19 RX ADMIN — ATRACURIUM BESYLATE 10 MG: 10 INJECTION, SOLUTION INTRAVENOUS at 09:33

## 2019-12-19 NOTE — OP NOTE
Preoperative diagnosis:   Recurrent hiatal hernia and partial gastric outlet obstruction status post laparoscopic sleeve gastrectomy July 2010, Lap HHR/sleeve revision 7/15     Postoperative diagnosis:  Same     Procedure:   Laparoscopic proximal Afshan-en-Y gastrojejunostomy                       Laparoscopic recurrent hiatal hernia repair (not paraesophageal)                       EGD     Surgeon:  Narendra Jacobs MD    Asst:    Logan Contreras MD PGY-4        Anesth:  GETA     EBL:  100 cc     Specimens:  None     Drains:  None     Counts:  Correct     Complics:  None     Indications:  This is a 49-year-old previously morbidly obese white female known to me status post laparoscopic sleeve gastrectomy in July 2010, laparoscopic cholecystectomy in April 2011 and laparoscopic hiatal hernia repair with resection of redundant cardia in July 2015.  Her reflux symptoms resolved for about 2-1/2 years but have come back and are not well controlled with maximal medical therapy.  Upper GI shows a very small intermittent sliding-type hiatal hernia less than 2 cm in market reflux and a dilated esophagus to the level of the cervical esophagus.  EGD revealed a tiny intermittent recurrent sliding hiatal hernia with irregular Z line which was at 34 cm distal esophageal biopsies were consistent with reflux.  Please see our office notes  Risks benefits alternative therapies were discussed and she was not interested in conversion to a Afshan-en-Y gastric bypass but is agreeable to laparoscopic recurrent hiatal hernia repair and if her sleeve appears to still be partially obstructed then pursue a laparoscopic Afshan-en-Y gastrojejunostomy with the understanding that this may not alleviate her symptoms.     Operative Technique:   The patient was brought to the operating room and placed supine upon the operating room table, SCD hose were placed, she underwent uneventful general endotracheal anesthesia per the anesthesiology staff, she  received IV ancef and subcutaneous heparin 5000u, and her abdomen was prepped and draped with ChloraPrep in a sterile fashion, an Ioban was used.   The peritoneal cavity was entered in the upper abdomen to left of midline using an 11 mm trocar and an Optiview technique and the abdomen was insufflated to a pressure of 15 mmHg with CO2 gas.  Exploratory laparoscopy revealed no evidence of injury from the entrance technique, a floppy mildly enlarged liver, status post cholecystectomy.  Remaining trochars were placed under direct visualization including an additional 11 mm trocar in the left midabdomen and 5 mm trochars in the right upper quadrant and left subcostal position.  Through a stab incision in the epigastrium a Jessy retractor was used to elevate the left lobe of the liver where there were dense adhesions from the undersurface to the lesser omentum and proximal sleeve.  These adhesions were lysed with cautery and the LigaSure device eventually exposing the hiatus.  Some oozing from the liver capsule was treated with FloSeal and Ray-Kathryn's as needed.  Not an obvious recurrent hiatal hernia and the hiatus was photo documented.  Omental adhesions to the lateral sleeve were divided exposing the left mundo.  The hernia sac was incised along the base of the right mundo and this was extended up and across the phrenoesophageal membrane.  This was done on the left side as well.  The hernia sac and its contents were then dissected out of the mediastinum, there was not a paraesophageal component.  A lot of scarring from previous dissection no evidence of infection.  Half inch Penrose drain was used temporarily for esophageal retraction.  The anterior and posterior vagus nerves were preserved.  The crura were dissected their meeting point inferiorly.  The hiatal hernia repair was performed posteriorly with a single interrupted 0 silk suture with good result photodocumentation obtained before and after repair.  The sleeve  clearly was kinked at the angularis.  Lysis of adhesions was performed trying to straighten this out without success.  Several photos obtained.  I elected to proceed with laparoscopic proximal gastrojejunostomy.  The right upper quadrant 5 mm trocar was changed out to a fascial splitting 12 mm trocar under direct visualization and the 5 mm trocar was placed in the right midabdomen under direct visualization.    The omentum was elevated in the ligament of Treitz identified and run distally for approximately 50 cm in the small bowel in this area was divided with an [place] 60 mm articulating electric stapler with a blue load and a single absorbable Veritas marilyn-strip.  The underlying mesentery was divided for short distance.  The tip of the Afshan limb became ischemic and was excised and retrieved through the 12 mm trocar site incision without widening the fascia and discarded.  The Afshan limb was run for 125 cm.  A side to side jejunojejunostomy was then performed by making an enterotomy on the antimesenteric border of each limb and creating a common lumen with a 60 mm blue load with a single absorbable strip.  The intervening enterotomy was closed in 2 layers using running 2-0 Vicryl plus suture.    The omentum was divided anteriorly and the Afshan limb brought up and appropriate orientation where it reach nicely to the proximal sleeve without tension.  A side to side gastrojejunostomy was then performed from the end of the Afshan limb to the proximal sleeve.  A 36 Urdu bougie dilator was passed by the anesthesiology staff and manipulated into the antrum.  The Afshan limb was tacked to the proximal sleeve with an interrupted 2-0 silk suture.  A gastrotomy was made as well as a small bowel enterotomy in the Afshan limb and then a common lumen was created with the 60 mm stapler with a green load and a single absorbable Veritas marilyn-strip.  The intervening enterotomy was closed in 2 layers using a running 2-0 Vicryl plus  suture.  The anastomosis appeared to rest nicely without tension or torsion.  The bougie dilator was removed.  There was some excess Afshan limb candycane approximately 10 cm.  The underlying mesentery was divided with the LigaSure and the Afshan limb was divided a couple centimeters away from the gastrojejunostomy with a blue load with a single absorbable strip.  The small bowel was retrieved through the 12 mm trocar site incision once again without widening the fascia and discarded.    The Afshan limb was occluded with a noncrushing bowel clamp and the anastomosis was submerged under saline and I performed upper endoscopy with a standard flexible endoscope.  No air bubbles or leak seen, no bleeding from the staple line, no ischemia of the anastomosis, gastric mucosa, or small bowel mucosa.  I was able to enter the Afshan limb and the native sleeve gastrectomy.  No hiatal hernia.  I did obtain some endoscopic photos but there appeared to be some technical difficulties and printing these at the time of dictation.  The endoscope was withdrawn.    Irrigation fluid was suctioned free.  The potential mesenteric defect at Mejias's space was closed using a running 2-0 silk suture.  The potential mesenteric defect at the jejunojejunostomy was closed with a running 2-0 silk suture.  All quadrants inspected no other abnormalities noted hemostasis excellent.  The liver had been hemostatic for quite some time.  Remaining additional FloSeal was placed around the anastomosis rather than waste it.  All trocars were removed under direct visualization, no bleeding noted from their sites.  Operative time was around 3 hours and the patient was redosed with Ancef.    The abdomen was desufflated of gas and skin in each incision was closed using 3-0 Monocryl plus in an interrupted subcuticular stitch followed by skin affix.  The patient tolerated the procedure well without complication and was taken to the recovery room in stable condition.

## 2019-12-19 NOTE — ANESTHESIA PROCEDURE NOTES
Airway  Urgency: elective    Date/Time: 12/19/2019 8:59 AM  Airway not difficult    General Information and Staff    Patient location during procedure: OR  CRNA: Cristin France CRNA    Indications and Patient Condition  Indications for airway management: airway protection    Preoxygenated: yes  MILS not maintained throughout  Mask difficulty assessment: 0 - not attempted    Final Airway Details  Final airway type: endotracheal airway      Successful airway: ETT  Cuffed: yes   Successful intubation technique: direct laryngoscopy and RSI  Facilitating devices/methods: intubating stylet and cricoid pressure  Endotracheal tube insertion site: oral  Blade: Frank  Blade size: 3  ETT size (mm): 7.0  Cormack-Lehane Classification: grade I - full view of glottis  Placement verified by: chest auscultation and capnometry   Measured from: lips  ETT/EBT  to lips (cm): 20  Number of attempts at approach: 1  Assessment: lips, teeth, and gum same as pre-op and atraumatic intubation    Additional Comments  Negative epigastric sounds, Breath sound equal bilaterally with symmetric chest rise and fall

## 2019-12-19 NOTE — BRIEF OP NOTE
HIATAL HERNIA REPAIR LAPAROSCOPIC, GASTROJEJUNOSTOMY LAPAROSCOPIC, ESOPHAGOGASTRODUODENOSCOPY  Progress Note    Conchita Acevedo  12/19/2019    Pre-op Diagnosis:   Hiatal hernia with gastroesophageal reflux [K21.9, K44.9]  Partial gastric outlet obstruction [K31.1]       Post-Op Diagnosis Codes:     * Hiatal hernia with gastroesophageal reflux [K21.9, K44.9]     * Partial gastric outlet obstruction [K31.1]    Procedure/CPT® Codes:  AK LAP GASTRIC BYPASS/RONNI-EN-Y [46020]  AK LAP,ESOPHAGOGAST FUNDOPLASTY [01724]  AK ESOPHAGOGASTRODUODENOSCOPY TRANSORAL DIAGNOSTIC [82067]    Procedure(s):  HIATAL HERNIA REPAIR LAPAROSCOPIC  GASTROJEJUNOSTOMY LAPAROSCOPIC  ESOPHAGOGASTRODUODENOSCOPY    Surgeon(s):  Narendra Jacobs MD   Asst:  Logan Contreras MD PGY-4    Anesthesia: General with Block    Staff:   Circulator: Nancy Arias RN; Ely Allen RN; Sarah Vazquez RN  Scrub Person: Ross Saenz; Virginia Barkley; Krish Mary  Nursing Assistant: Andria Duran    Estimated Blood Loss: 20 mL    Urine Voided: * No values recorded between 12/19/2019  8:53 AM and 12/19/2019 12:12 PM *    Specimens:                None          Drains: * No LDAs found *    Findings:     Complications: none      Narendra Jacobs MD     Date: 12/19/2019  Time: 12:12 PM

## 2019-12-19 NOTE — ANESTHESIA POSTPROCEDURE EVALUATION
Patient: Conchita Acevedo    Procedure Summary     Date:  12/19/19 Room / Location:   [facility]    Anesthesia Start:  0854 Anesthesia Stop:      Procedures:       HIATAL HERNIA REPAIR LAPAROSCOPIC (N/A Abdomen)      GASTROJEJUNOSTOMY LAPAROSCOPIC (N/A Abdomen)      ESOPHAGOGASTRODUODENOSCOPY (N/A Esophagus) Diagnosis:       Hiatal hernia with gastroesophageal reflux      Partial gastric outlet obstruction      (Hiatal hernia with gastroesophageal reflux [K21.9, K44.9])      (Partial gastric outlet obstruction [K31.1])    Surgeon:  Narendra Jacobs MD Provider:  Matt Melvin Jr., MD    Anesthesia Type:  general with block ASA Status:  3          Anesthesia Type: general with block    Vitals  No vitals data found for the desired time range.          Post Anesthesia Care and Evaluation    Patient location during evaluation: PACU  Patient participation: complete - patient participated  Level of consciousness: awake and alert  Pain score: 0  Pain management: adequate  Airway patency: patent  Anesthetic complications: No anesthetic complications  PONV Status: none  Cardiovascular status: hemodynamically stable and acceptable  Respiratory status: nonlabored ventilation, acceptable and nasal cannula  Hydration status: acceptable

## 2019-12-19 NOTE — ANESTHESIA PREPROCEDURE EVALUATION
Anesthesia Evaluation     Patient summary reviewed and Nursing notes reviewed   no history of anesthetic complications:  NPO Solid Status: > 8 hours  NPO Liquid Status: > 2 hours           Airway   Mallampati: II  TM distance: >3 FB  Neck ROM: full  No difficulty expected  Dental - normal exam     Pulmonary - normal exam    breath sounds clear to auscultation  (-) COPD, asthma, recent URI, sleep apnea  Cardiovascular - normal exam    ECG reviewed  Rhythm: regular  Rate: normal    (-) CAD, dysrhythmias, angina, CHF, orthopnea, murmur    ROS comment: 8/22/19: Acceptable negative IV dobutamine stress echocardiographic study suggestive of low probability for significant focal obstructive coronary artery disease with preserved systolic left ventricular function following pharmacologic stress to 87% predicted maximum heart rate      · Mild mitral valve regurgitation is present.  · Estimated EF = 68%.  · Left ventricular systolic function is normal.  · Left ventricular diastolic function is normal.  · Normal right ventricular cavity size, wall thickness, systolic function and septal motion noted.  · No evidence of pulmonary hypertension is present.  · There is no evidence of pericardial effusion.  · No structural valvular abnormality demonstrated.    Neuro/Psych  (+) psychiatric history Anxiety,     GI/Hepatic/Renal/Endo    (+) obesity,  hiatal hernia, GERD,  diabetes mellitus (preop glucose 119) type 2, thyroid problem hypothyroidism    ROS Comment: Partial gastric outlet obstruction.     Musculoskeletal (-) negative ROS    Abdominal    Substance History - negative use     OB/GYN          Other - negative ROS                       Anesthesia Plan    ASA 3     general with block   Rapid sequence(Patient with hiatal hernia, partial gastric outlet obstruction, nausea this AM, and uncontrolled GERD. Will plan on GA with ETT and RSI. TAP blocks after induction/intubation. )  intravenous induction     Anesthetic plan, all  risks, benefits, and alternatives have been provided, discussed and informed consent has been obtained with: patient.    Plan discussed with CRNA.

## 2019-12-19 NOTE — ANESTHESIA PROCEDURE NOTES
Peripheral Block      Patient reassessed immediately prior to procedure    Patient location during procedure: OR  Reason for block: at surgeon's request and post-op pain management  Performed by  Anesthesiologist: Matt Melvin Jr., MD  Preanesthetic Checklist  Completed: patient identified, site marked, surgical consent, pre-op evaluation, timeout performed, IV checked, risks and benefits discussed and monitors and equipment checked  Prep:  Pt Position: supine  Sterile barriers:cap, gloves, sterile barriers and mask  Prep: ChloraPrep  Patient monitoring: blood pressure monitoring, continuous pulse oximetry and EKG  Procedure  Sedation:yes  Performed under: general  Guidance:ultrasound guided  Images:still images obtained, printed/placed on chart    Laterality:Bilateral  Block Type:TAP  Injection Technique:single-shot  Needle Type:short-bevel and echogenic  Needle Gauge:20 G  Resistance on Injection: none    Medications Used: buprenorphine (BUPRENEX) injection, 0.3 mg  dexamethasone sodium phosphate injection, 4 mg  bupivacaine PF (MARCAINE) 0.25 % injection, 60 mL  Med admintered at 12/19/2019 9:05 AM      Medications  Comment:Block Injection:  LA dose divided between Right and Left block        Post Assessment  Injection Assessment: negative aspiration for heme, incremental injection and no paresthesia on injection  Patient Tolerance:comfortable throughout block  Complications:no  Additional Notes  4 quadrant TAPS    Under Ultrasound guidance, a BBraun 4inch 360 degree needle was advanced with Normal Saline hydro dissection of tissue.  The Internal Oblique and Transversus Abdominus muscles where visualized.  At or before the aponeurosis of Internal Oblique, local anesthetic spread was visualized in the Transversus Abdominus Plane. Injection was made incrementally with aspiration every 5 mls.  There was no  intravascular injection,  injection pressure was normal, there was no neural injection, and the procedure  was completed without difficulty.  Thank You.

## 2019-12-20 ENCOUNTER — APPOINTMENT (OUTPATIENT)
Dept: GENERAL RADIOLOGY | Facility: HOSPITAL | Age: 49
End: 2019-12-20

## 2019-12-20 VITALS
TEMPERATURE: 98.6 F | OXYGEN SATURATION: 95 % | DIASTOLIC BLOOD PRESSURE: 69 MMHG | BODY MASS INDEX: 31.31 KG/M2 | HEART RATE: 72 BPM | SYSTOLIC BLOOD PRESSURE: 145 MMHG | WEIGHT: 183.4 LBS | HEIGHT: 64 IN | RESPIRATION RATE: 18 BRPM

## 2019-12-20 LAB
ALBUMIN SERPL-MCNC: 3.6 G/DL (ref 3.5–5.2)
ALBUMIN/GLOB SERPL: 1.2 G/DL
ALP SERPL-CCNC: 87 U/L (ref 39–117)
ALT SERPL W P-5'-P-CCNC: 63 U/L (ref 1–33)
ANION GAP SERPL CALCULATED.3IONS-SCNC: 12 MMOL/L (ref 5–15)
AST SERPL-CCNC: 57 U/L (ref 1–32)
BASOPHILS # BLD AUTO: 0.05 10*3/MM3 (ref 0–0.2)
BASOPHILS NFR BLD AUTO: 0.3 % (ref 0–1.5)
BILIRUB SERPL-MCNC: 0.7 MG/DL (ref 0.2–1.2)
BUN BLD-MCNC: 4 MG/DL (ref 6–20)
BUN/CREAT SERPL: 6.6 (ref 7–25)
CALCIUM SPEC-SCNC: 8.2 MG/DL (ref 8.6–10.5)
CHLORIDE SERPL-SCNC: 103 MMOL/L (ref 98–107)
CO2 SERPL-SCNC: 23 MMOL/L (ref 22–29)
CREAT BLD-MCNC: 0.61 MG/DL (ref 0.57–1)
DEPRECATED RDW RBC AUTO: 51 FL (ref 37–54)
EOSINOPHIL # BLD AUTO: 0.16 10*3/MM3 (ref 0–0.4)
EOSINOPHIL NFR BLD AUTO: 1 % (ref 0.3–6.2)
ERYTHROCYTE [DISTWIDTH] IN BLOOD BY AUTOMATED COUNT: 14.8 % (ref 12.3–15.4)
GFR SERPL CREATININE-BSD FRML MDRD: 105 ML/MIN/1.73
GLOBULIN UR ELPH-MCNC: 2.9 GM/DL
GLUCOSE BLD-MCNC: 143 MG/DL (ref 65–99)
GLUCOSE BLDC GLUCOMTR-MCNC: 136 MG/DL (ref 70–130)
GLUCOSE BLDC GLUCOMTR-MCNC: 161 MG/DL (ref 70–130)
GLUCOSE BLDC GLUCOMTR-MCNC: 176 MG/DL (ref 70–130)
HCT VFR BLD AUTO: 41.9 % (ref 34–46.6)
HGB BLD-MCNC: 13.1 G/DL (ref 12–15.9)
IMM GRANULOCYTES # BLD AUTO: 0.06 10*3/MM3 (ref 0–0.05)
IMM GRANULOCYTES NFR BLD AUTO: 0.4 % (ref 0–0.5)
IRON 24H UR-MRATE: 61 MCG/DL (ref 37–145)
LYMPHOCYTES # BLD AUTO: 1.84 10*3/MM3 (ref 0.7–3.1)
LYMPHOCYTES NFR BLD AUTO: 12 % (ref 19.6–45.3)
MCH RBC QN AUTO: 29.4 PG (ref 26.6–33)
MCHC RBC AUTO-ENTMCNC: 31.3 G/DL (ref 31.5–35.7)
MCV RBC AUTO: 93.9 FL (ref 79–97)
MONOCYTES # BLD AUTO: 0.9 10*3/MM3 (ref 0.1–0.9)
MONOCYTES NFR BLD AUTO: 5.8 % (ref 5–12)
NEUTROPHILS # BLD AUTO: 12.38 10*3/MM3 (ref 1.7–7)
NEUTROPHILS NFR BLD AUTO: 80.5 % (ref 42.7–76)
NRBC BLD AUTO-RTO: 0 /100 WBC (ref 0–0.2)
PLATELET # BLD AUTO: 356 10*3/MM3 (ref 140–450)
PMV BLD AUTO: 9.9 FL (ref 6–12)
POTASSIUM BLD-SCNC: 3.6 MMOL/L (ref 3.5–5.2)
PROT SERPL-MCNC: 6.5 G/DL (ref 6–8.5)
RBC # BLD AUTO: 4.46 10*6/MM3 (ref 3.77–5.28)
SODIUM BLD-SCNC: 138 MMOL/L (ref 136–145)
WBC NRBC COR # BLD: 15.39 10*3/MM3 (ref 3.4–10.8)

## 2019-12-20 PROCEDURE — 99024 POSTOP FOLLOW-UP VISIT: CPT | Performed by: SURGERY

## 2019-12-20 PROCEDURE — 74241: CPT

## 2019-12-20 PROCEDURE — 82962 GLUCOSE BLOOD TEST: CPT

## 2019-12-20 PROCEDURE — 85025 COMPLETE CBC W/AUTO DIFF WBC: CPT | Performed by: SURGERY

## 2019-12-20 PROCEDURE — 83540 ASSAY OF IRON: CPT | Performed by: SURGERY

## 2019-12-20 PROCEDURE — 25010000002 ONDANSETRON PER 1 MG: Performed by: SURGERY

## 2019-12-20 PROCEDURE — 25010000002 THIAMINE PER 100 MG: Performed by: SURGERY

## 2019-12-20 PROCEDURE — 80053 COMPREHEN METABOLIC PANEL: CPT | Performed by: SURGERY

## 2019-12-20 PROCEDURE — 25010000002 HEPARIN (PORCINE) PER 1000 UNITS: Performed by: SURGERY

## 2019-12-20 PROCEDURE — 25010000002 CEFAZOLIN PER 500 MG: Performed by: SURGERY

## 2019-12-20 PROCEDURE — 0 DIATRIZOATE MEGLUMINE & SODIUM PER 1 ML: Performed by: SURGERY

## 2019-12-20 PROCEDURE — 94799 UNLISTED PULMONARY SVC/PX: CPT

## 2019-12-20 PROCEDURE — 25010000002 CYANOCOBALAMIN PER 1000 MCG: Performed by: SURGERY

## 2019-12-20 RX ORDER — ONDANSETRON 4 MG/1
4 TABLET, FILM COATED ORAL EVERY 4 HOURS PRN
Qty: 20 TABLET | Refills: 0 | Status: SHIPPED | OUTPATIENT
Start: 2019-12-20 | End: 2020-03-27 | Stop reason: SDUPTHER

## 2019-12-20 RX ORDER — HYDROCODONE BITARTRATE AND ACETAMINOPHEN 7.5; 325 MG/1; MG/1
1 TABLET ORAL EVERY 4 HOURS PRN
Qty: 12 TABLET | Refills: 0 | Status: SHIPPED | OUTPATIENT
Start: 2019-12-20 | End: 2019-12-30

## 2019-12-20 RX ADMIN — CYANOCOBALAMIN 1000 MCG: 1000 INJECTION, SOLUTION INTRAMUSCULAR; SUBCUTANEOUS at 07:56

## 2019-12-20 RX ADMIN — POTASSIUM CHLORIDE AND SODIUM CHLORIDE 125 ML/HR: 450; 150 INJECTION, SOLUTION INTRAVENOUS at 09:12

## 2019-12-20 RX ADMIN — ACETAMINOPHEN 1000 MG: 500 TABLET, FILM COATED ORAL at 08:04

## 2019-12-20 RX ADMIN — THIAMINE HYDROCHLORIDE 250 ML/HR: 100 INJECTION, SOLUTION INTRAMUSCULAR; INTRAVENOUS at 05:08

## 2019-12-20 RX ADMIN — ONDANSETRON 4 MG: 2 INJECTION INTRAMUSCULAR; INTRAVENOUS at 00:58

## 2019-12-20 RX ADMIN — DIATRIZOATE MEGLUMINE AND DIATRIZOATE SODIUM 120 ML: 660; 100 LIQUID ORAL; RECTAL at 09:58

## 2019-12-20 RX ADMIN — INSULIN LISPRO 2 UNITS: 100 INJECTION, SOLUTION INTRAVENOUS; SUBCUTANEOUS at 12:52

## 2019-12-20 RX ADMIN — GABAPENTIN 100 MG: 100 CAPSULE ORAL at 08:04

## 2019-12-20 RX ADMIN — METFORMIN HYDROCHLORIDE 1000 MG: 1000 TABLET ORAL at 08:03

## 2019-12-20 RX ADMIN — GABAPENTIN 100 MG: 100 CAPSULE ORAL at 16:01

## 2019-12-20 RX ADMIN — ONDANSETRON 4 MG: 2 INJECTION INTRAMUSCULAR; INTRAVENOUS at 05:03

## 2019-12-20 RX ADMIN — PANTOPRAZOLE SODIUM 40 MG: 40 INJECTION, POWDER, FOR SOLUTION INTRAVENOUS at 05:04

## 2019-12-20 RX ADMIN — HYDROCODONE BITARTRATE AND ACETAMINOPHEN 1 TABLET: 7.5; 325 TABLET ORAL at 14:16

## 2019-12-20 RX ADMIN — HEPARIN SODIUM 5000 UNITS: 5000 INJECTION INTRAVENOUS; SUBCUTANEOUS at 14:16

## 2019-12-20 RX ADMIN — HEPARIN SODIUM 5000 UNITS: 5000 INJECTION INTRAVENOUS; SUBCUTANEOUS at 05:03

## 2019-12-20 RX ADMIN — THYROID, PORCINE 45 MG: 30 TABLET ORAL at 08:04

## 2019-12-20 RX ADMIN — HYDROCODONE BITARTRATE AND ACETAMINOPHEN 1 TABLET: 7.5; 325 TABLET ORAL at 18:34

## 2019-12-20 RX ADMIN — DEXTROSE MONOHYDRATE 2 G: 50 INJECTION, SOLUTION INTRAVENOUS at 00:58

## 2019-12-20 RX ADMIN — ESCITALOPRAM OXALATE 20 MG: 20 TABLET ORAL at 08:04

## 2019-12-20 NOTE — PROGRESS NOTES
Discharge Planning Assessment  [facility]     Patient Name: Conchita Acevedo  MRN: [id number]  Today's Date: 12/20/2019    Admit Date: 12/19/2019    Discharge Needs Assessment     Row Name 12/20/19 1430       Living Environment    Lives With  spouse;child(verna), dependent    Name(s) of Who Lives With Patient  SpouseKelliHowietreva Acevedo    Current Living Arrangements  home/apartment/condo    Primary Care Provided by  self    Provides Primary Care For  child(verna)    Family Caregiver if Needed  spouse    Family Caregiver Names  Spouse, Howie Acevedo    Quality of Family Relationships  supportive;involved;helpful    Able to Return to Prior Arrangements  yes    Living Arrangement Comments  Lives with spouse and children in house with 2 steps at entrance       Resource/Environmental Concerns    Resource/Environmental Concerns  none    Transportation Concerns  car, none       Transition Planning    Patient/Family Anticipates Transition to  home with family    Patient/Family Anticipated Services at Transition  none    Transportation Anticipated  family or friend will provide       Discharge Needs Assessment    Readmission Within the Last 30 Days  no previous admission in last 30 days    Concerns to be Addressed  no discharge needs identified    Equipment Currently Used at Home  glucometer;other (see comments) BP cuff    Anticipated Changes Related to Illness  none    Equipment Needed After Discharge  none        Discharge Plan     Row Name 12/20/19 1431       Plan    Plan  Plan is home    Patient/Family in Agreement with Plan  yes    Plan Comments  Met with patient and spouse in the room for initial discharge planning.  Lives with spouse and children in house in [place] with 2 steps at entrance.  States she manages steps without difficulty.  Independent.  PCP is Becca Beckham.  She has a glucometer and BP cuff.  Denies discharge needs.  Following     Final Discharge Disposition Code  01 - home or self-care         Destination      Coordination has not been started for this encounter.      Durable Medical Equipment      Coordination has not been started for this encounter.      Dialysis/Infusion      Coordination has not been started for this encounter.      Home Medical Care      Coordination has not been started for this encounter.      Therapy      Coordination has not been started for this encounter.      Community Resources      Coordination has not been started for this encounter.          Demographic Summary     Row Name 12/20/19 1429       General Information    Admission Type  inpatient    Arrived From  operating room    Referral Source  admission list    Reason for Consult  discharge planning    Preferred Language  English        Functional Status     Row Name 12/20/19 1429       Functional Status    Usual Activity Tolerance  excellent    Current Activity Tolerance  excellent       Functional Status, IADL    Medications  independent    Meal Preparation  independent    Housekeeping  independent    Laundry  independent    Shopping  independent        Psychosocial    No documentation.       Abuse/Neglect    No documentation.       Legal    No documentation.       Substance Abuse    No documentation.       Patient Forms    No documentation.           Dorothea Trinidad RN

## 2019-12-20 NOTE — PROGRESS NOTES
"Cc: POD#1 laparoscopic recurrent hiatal hernia repair and proximal gastrojejunostomy  \"I feel fine\"    Her  is in the room.  She looks and feels well and wants to go home.  She says the surgery is much better than the previous surgery.  She is concerned that the upper GI showed most of the contrast going down her Afshan limb.  Her preoperative symptoms have resolved and she noted that even drinking water last night when I saw her then.  She is ambulating and voiding well.  Has no pulmonary complaints.  No nausea but would like something in case.  No bowel movement or flatus.  Using her spirometer.  No pulmonary complaints.  No fever or tachycardia pulse 72 respirations 18 blood pressure 145/69  UO 1350 - 1200 she is no apparent distress.  Lungs clear to auscultation.  Heart regular rhythm.  Abdomen soft, nontender, nondistended, bowel sounds hypoactive.  Wounds look okay  CMP normal except for glucose of 143 BUN of 4 calcium 8.2 SGPT 63 SGOT 57 iron is 61 white blood count 15.4 with 81 segs 12 lymphs 6 monocytes H&H 13.1 and 41.9 hemoglobin A1c elevated 6.90 upper GI images reviewed and discussed with the radiology PA, no leak or obstruction some contrast going down the native sleeve the majority preferentially going down the gastrojejunostomy.    Impression: Doing okay postoperative day #1 laparoscopic recurrent hiatal hernia repair and proximal Afshan-en-Y gastrojejunostomy    Plan: Discharge home.  Discharge instructions were discussed.  See orders  "

## 2019-12-20 NOTE — PLAN OF CARE
Problem: Patient Care Overview  Goal: Plan of Care Review  Outcome: Ongoing (interventions implemented as appropriate)  Flowsheets (Taken 12/19/2019 8643)  Progress: improving  Plan of Care Reviewed With: patient  Outcome Summary: Pt VSS. Pt very drowsy when arrived to floor. Ambulated in fountain 1x. Pt had not urinated at 18:30. Bladder scan showed 539ml. I&O cath totaled 700ml. Pt denies pain, denies nausea. Instucted on IS.     Problem: Bariatric Surgery (Adult,Pediatric)  Goal: Signs and Symptoms of Listed Potential Problems Will be Absent, Minimized or Managed (Bariatric Surgery)  Outcome: Ongoing (interventions implemented as appropriate)     Problem: Bariatric Surgery (Adult,Pediatric)  Goal: Anesthesia/Sedation Recovery  Outcome: Ongoing (interventions implemented as appropriate)

## 2019-12-20 NOTE — PLAN OF CARE
Problem: Patient Care Overview  Goal: Plan of Care Review  Outcome: Ongoing (interventions implemented as appropriate)  Flowsheets  Taken 12/20/2019 0427 by Lorna Lazo RN  Progress: improving  Outcome Summary: Patient's VSS and on RA. Patient has been drowsy during the night but has improved more in to the early morning. Patient had to be In and Out cath'd around 18:30 per day shift report as she was unable to void. Bladder scanned the patient around 0100 and it showed 321ml, offered to cath the patient at that time but she wanted to wait a little longer. Allowed patient more time to wake up and she was able to spontaneously void 400ml around 0400. Patient has not complained of pain or N/V. Will continue to monitor.  Taken 12/19/2019 1858 by Amada Dejesus RN  Plan of Care Reviewed With: patient     Problem: Bariatric Surgery (Adult,Pediatric)  Goal: Signs and Symptoms of Listed Potential Problems Will be Absent, Minimized or Managed (Bariatric Surgery)  Outcome: Ongoing (interventions implemented as appropriate)  Flowsheets (Taken 12/20/2019 0427)  Problems Assessed (Bariatric Surgery): all  Problems Present (Bariatric Surgery): bowel motility decreased; situational response

## 2019-12-21 ENCOUNTER — NURSE TRIAGE (OUTPATIENT)
Dept: CALL CENTER | Facility: HOSPITAL | Age: 49
End: 2019-12-21

## 2019-12-21 ENCOUNTER — READMISSION MANAGEMENT (OUTPATIENT)
Dept: CALL CENTER | Facility: HOSPITAL | Age: 49
End: 2019-12-21

## 2019-12-21 NOTE — OUTREACH NOTE
Prep Survey      Responses   Facility patient discharged from?  [facility]   Is patient eligible?  Yes   Discharge diagnosis  HIATAL HERNIA REPAIR LAPAROSCOPIC   Does the patient have one of the following disease processes/diagnoses(primary or secondary)?  General Surgery   Does the patient have Home health ordered?  No   Is there a DME ordered?  No   Prep survey completed?  Yes          Carie Ortiz RN

## 2019-12-22 NOTE — TELEPHONE ENCOUNTER
Deni states that she is having sharp stabbing pain in her right upper side/chest/back going up to her collar bone.  Deni had lap hiatal hernia repair on 12/19.  She states that she had little pain there yesterday afternoon but it is worse today.  Discussed the CO2 used in laparoscopic surgery and questions answered.  She will call back if pain gets worse or isn't improved tomorrow.    Reason for Disposition  • Other post-op symptom or question    Additional Information  • Negative: Sounds like a life-threatening emergency to the triager  • Negative: Chest pain  • Negative: Difficulty breathing  • Negative: Surgical incision symptoms and questions  • Negative: [1] Discomfort (pain, burning or stinging) when passing urine AND [2] male  • Negative: [1] Discomfort (pain, burning or stinging) when passing urine AND [2] female  • Negative: Constipation  • Negative: New or worsening leg (calf, thigh) pain  • Negative: New or worsening leg swelling  • Negative: Dizziness is severe, or persists > 24 hours after surgery  • Negative: Pain, redness, swelling, or pus at IV Site  • Negative: Symptoms arising from use of a urinary catheter (Rocha or Coude)  • Negative: Cast problems or questions  • Negative: Medication question  • Negative: [1] Widespread rash AND [2] bright red, sunburn-like  • Negative: [1] SEVERE headache AND [2] after spinal (epidural) anesthesia  • Negative: [1] Vomiting AND [2] persists > 4 hours  • Negative: [1] Vomiting AND [2] abdomen looks much more swollen than usual  • Negative: [1] Drinking very little AND [2] dehydration suspected (e.g., no urine > 12 hours, very dry mouth, very lightheaded)  • Negative: Patient sounds very sick or weak to the triager  • Negative: Sounds like a serious complication to the triager  • Negative: Fever > 100.4 F (38.0 C)  • Negative: [1] SEVERE post-op pain (e.g., excruciating, pain scale 8-10) AND [2] not controlled with pain medications  • Negative: [1] Deni has  "URGENT question AND [2] triager unable to answer question  • Negative: [1] Headache AND [2] after spinal (epidural) anesthesia AND [3] not severe  • Negative: Fever present > 3 days (72 hours)  • Negative: [1] MILD-MODERATE post-op pain (e.g., pain scale 1-7) AND [2] not controlled with pain medications  • Negative: [1] Caller has NON-URGENT question AND [2] triager unable to answer question  • Negative: General activity, questions about  • Negative: Resuming driving, questions about  • Negative: Resuming sexual relations, questions about  • Negative: Getting the incision wet, questions about  • Negative: Throat pain after surgery, questions about  • Negative: [1] Vomiting AND [2] present < 4 hours    Answer Assessment - Initial Assessment Questions  1. SYMPTOM: \"What's the main symptom you're concerned about?\" (e.g., pain, fever, vomiting)      *No Answer*pain right upper chest to collar bone  2. ONSET: \"When did  Start?\"yesterday but worse today        3. SURGERY: \"What surgery was performed?\"      Laparoscopic hiatal hernia repair  4. DATE of SURGERY: \"When was surgery performed?\"       12/19  5. ANESTHESIA: \" What type of anesthesia did you have?\" (e.g., general, spinal, epidural, local)      general  6. PAIN: \"Is there any pain?\" If so, ask: \"How bad is it?\"  (Scale 1-10; or mild, moderate, severe)      moderate  7. FEVER: \"Do you have a fever?\" If so, ask: \"What is your temperature, how was it measured, and when did it start?\"      no  8. VOMITING: \"Is there any vomiting?\" If yes, ask: \"How many times?\"      no  9. BLEEDING: \"Is there any bleeding?\" If so, ask: \"How much?\" and \"Where?\"      no  10. OTHER SYMPTOMS: \"Do you have any other symptoms?\" (e.g., drainage from wound, painful urination, constipation)        no    Protocols used: POST-OP SYMPTOMS AND QUESTIONS-ADULT-AH      "

## 2019-12-23 ENCOUNTER — READMISSION MANAGEMENT (OUTPATIENT)
Dept: CALL CENTER | Facility: HOSPITAL | Age: 49
End: 2019-12-23

## 2019-12-23 NOTE — OUTREACH NOTE
General Surgery Week 1 Survey      Responses   Facility patient discharged from?  [facility]   Does the patient have one of the following disease processes/diagnoses(primary or secondary)?  General Surgery   Is there a successful TCM telephone encounter documented?  No   Week 1 attempt successful?  Yes   Call start time  1256   Rescheduled  Rescheduled-pt requested   Call end time  1256   Discharge diagnosis  HIATAL HERNIA REPAIR LAPAROSCOPIC          Tatum Hough RN

## 2019-12-23 NOTE — PAYOR COMM NOTE
"  Debra Murry RN Utilization Review [phone]  Fax # [phone]  Ref # [id number]    Auth still pending. Please note discharge date.         Neff, Conchita Munoz (49 y.o. Female)     Date of Birth Social Security Number Address Home Phone MRN    1970  [id number] [facility] [address] [phone] [id number]    Denominational Marital Status          Protestant        Admission Date Admission Type Admitting Provider Attending Provider Department, Room/Bed    12/19/19 Elective Narendra Jacobs MD  [address], [address]/1    Discharge Date Discharge Disposition Discharge Destination        12/20/2019 Home or Self Care              Attending Provider:  (none)   Allergies:  Fish-derived Products, Peanut-containing Drug Products, Amoxil [Amoxicillin], Lovenox [Enoxaparin], Sulfa Antibiotics    Isolation:  None   Infection:  None   Code Status:  Prior    Ht:  161.3 cm (63.5\")   Wt:  83.2 kg (183 lb 6.4 oz)    Admission Cmt:  None   Principal Problem:  None                Active Insurance as of 12/19/2019     Primary Coverage     Payor Plan Insurance Group Employer/Plan Group    [facility] [id number]     Payor Plan Address Payor Plan Phone Number Payor Plan Fax Number Effective Dates    [address]   1/1/2016 - None Entered    [facility] [id number]       Subscriber Name Subscriber Birth Date Member ID       CONCHITA NEFF 1970 [id number]           Secondary Coverage     Payor Plan Insurance Group Employer/Plan Group    [facility] [id number]     Payor Plan Address Payor Plan Phone Number Payor Plan Fax Number Effective Dates    [address] [phone]  6/1/2011 - None Entered    [facility] [id number]       Subscriber Name Subscriber Birth Date Member ID       NEFFCONCHITA BOWER 1970 [id number]                 Emergency Contacts      (Rel.) Home Phone Work Phone Mobile Phone    Howie Neff (Spouse) [phone] -- " "[phone]        Narendra Jacobs MD   Physician   Surgery   Progress Notes   Signed   Date of Service:  12/20/19 1808   Creation Time:  12/20/19 1808            Signed             Show:Clear all  [x]Manual[]Template[]Copied    Added by:  [x]Narendra Jacobs MD    []Maxwell for details  Cc: POD#1 laparoscopic recurrent hiatal hernia repair and proximal gastrojejunostomy  \"I feel fine\"     Her  is in the room.  She looks and feels well and wants to go home.  She says the surgery is much better than the previous surgery.  She is concerned that the upper GI showed most of the contrast going down her Afshan limb.  Her preoperative symptoms have resolved and she noted that even drinking water last night when I saw her then.  She is ambulating and voiding well.  Has no pulmonary complaints.  No nausea but would like something in case.  No bowel movement or flatus.  Using her spirometer.  No pulmonary complaints.  No fever or tachycardia pulse 72 respirations 18 blood pressure 145/69  UO 1350 - 1200 she is no apparent distress.  Lungs clear to auscultation.  Heart regular rhythm.  Abdomen soft, nontender, nondistended, bowel sounds hypoactive.  Wounds look okay  CMP normal except for glucose of 143 BUN of 4 calcium 8.2 SGPT 63 SGOT 57 iron is 61 white blood count 15.4 with 81 segs 12 lymphs 6 monocytes H&H 13.1 and 41.9 hemoglobin A1c elevated 6.90 upper GI images reviewed and discussed with the radiology PA, no leak or obstruction some contrast going down the native sleeve the majority preferentially going down the gastrojejunostomy.     Impression: Doing okay postoperative day #1 laparoscopic recurrent hiatal hernia repair and proximal Afshan-en-Y gastrojejunostomy     Plan: Discharge home.  Discharge instructions were discussed.  See orders                  Discharge Order (From admission, onward)     Start     Ordered    12/20/19 1812  Discharge patient  Once     Expected Discharge Date:  12/20/19  "   Discharge Disposition:  Home or Self Care    Physician of Record for Attribution - Please select from Treatment Team:  TOM DIXON [[id number]]    Review needed by CMO to determine Physician of Record:  No       Question Answer Comment   Physician of Record for Attribution - Please select from Treatment Team TOM DIXON    Review needed by CMO to determine Physician of Record No        12/20/19 9459

## 2019-12-26 ENCOUNTER — OFFICE VISIT (OUTPATIENT)
Dept: BARIATRICS/WEIGHT MGMT | Facility: CLINIC | Age: 49
End: 2019-12-26

## 2019-12-26 ENCOUNTER — READMISSION MANAGEMENT (OUTPATIENT)
Dept: CALL CENTER | Facility: HOSPITAL | Age: 49
End: 2019-12-26

## 2019-12-26 VITALS
RESPIRATION RATE: 18 BRPM | OXYGEN SATURATION: 99 % | WEIGHT: 171 LBS | HEIGHT: 64 IN | DIASTOLIC BLOOD PRESSURE: 74 MMHG | BODY MASS INDEX: 29.19 KG/M2 | HEART RATE: 75 BPM | TEMPERATURE: 97.5 F | SYSTOLIC BLOOD PRESSURE: 122 MMHG

## 2019-12-26 DIAGNOSIS — K44.9 HIATAL HERNIA: ICD-10-CM

## 2019-12-26 DIAGNOSIS — Z98.84 STATUS POST BARIATRIC SURGERY: Primary | ICD-10-CM

## 2019-12-26 DIAGNOSIS — R53.83 FATIGUE, UNSPECIFIED TYPE: ICD-10-CM

## 2019-12-26 DIAGNOSIS — R10.9 ABDOMINAL PAIN, UNSPECIFIED ABDOMINAL LOCATION: ICD-10-CM

## 2019-12-26 PROCEDURE — 99024 POSTOP FOLLOW-UP VISIT: CPT | Performed by: PHYSICIAN ASSISTANT

## 2019-12-26 NOTE — PROGRESS NOTES
"[facility] Bariatric Surgery  [address]  [address]  [facility] [id number] [phone]      Patient Name:  Conchita Acevedo.  :  1970      Reason for Visit:  POD #7    HPI:  Conchita Acevedo is a 49 y.o. female s/p lap Afshan en Y GJ + lap recurrent HHR with Dr. Jacobs 19, previously s/p LSG with Dr. Jacobs 7/8/10, s/p lap courtney/ intraop cholangiogram/ liver biopsy (no abnormality) with Dr. Jacobs 11, s/p lap HHR with partial gastrectomy (for outpouching of the cardia on the greater curvature side) by Dr. Jacobs 7/24/15.    POD#1- some contrast going down native sleeve, majority preferentially going down GJ.     Doing ok. Overall very fatigued, worn out, worse than previous surgeries.   Has some shoulder pain at left collar bone with lying down only, not worse with PO intake or inspiration, noted anterior collar bone. Had been having bilateral shoulder \"gas\" pain which has resolved. Taking zofran BID at most for nausea. C/o left/ mid abdominal pain with prolonged standing, not assoc with PO intake.  Weaning off pain meds. Belching improved from pre surgery, although still notices some belching. She has several questions about diet progression, malabsorption/ vitamins given UGI showed most contrast through Afshan limb.  Denies dysphagia, pulmonary issues and fevers. Using US to 2200.   Tolerating diet progression - on stage 2.  Getting 80-100g prot/day.  Drinking 64+ fluid oz/day, water + sugar free lemonade + tea.  Taking MVI and iron.  On dexilant.  Holding ASA , NSAIDs , Tramadol, Hormones, Diuretics , Steroids and Immunologics, plans to start ASA in 2 weeks.   Ambulating.     Presurgery weight: 222 pounds.  Today's weight is 77.6 kg (171 lb) pounds, today's  Body mass index is 29.82 kg/m²., and her weight loss since surgery is 51 pounds.       Past Medical History:   Diagnosis Date   • Acid reflux    • Anxiety    • Diabetes mellitus (CMS/HCC)    • Hernia, hiatal  "   • Hypothyroidism    • MRSA infection 2015    incision from back surgery BELOW belly button per patient   • PCOS (polycystic ovarian syndrome)      Past Surgical History:   Procedure Laterality Date   • ANTERIOR LUMBAR FUSION W/ FRA  2015   • BLADDER SLING MODIFIED, ANTERIOR AND POSTERIOR VAGINAL REPAIR  2016   • ENDOSCOPY  12/19/2016     Dr. Jacobs, some mild diffuse gastritis,   • ENDOSCOPY  08/03/2016    Dr. Jacobs, mild gastrits and large surture at GE junction with surrounding foreign body reaction, suture was removed, no obvious HH, sleeve was fairly unremarkable. Distal esphageal bx revealsed ulcerative esophagitis with candida and gram + cocci.    • ENDOSCOPY N/A 12/19/2019    Procedure: ESOPHAGOGASTRODUODENOSCOPY;  Surgeon: Narendra Jacobs MD;  Location:  [facility];  Service: General   • GASTRIC SLEEVE LAPAROSCOPIC  07/08/2010   • GASTROJEJUNOSTOMY N/A 12/19/2019    Procedure: GASTROJEJUNOSTOMY LAPAROSCOPIC;  Surgeon: Narendra Jacobs MD;  Location:  [facility];  Service: General   • HIATAL HERNIA REPAIR  07/2015    Dr. Jacobs, [facility] partial gastectomy for outpouching of cardia   • HIATAL HERNIA REPAIR N/A 12/19/2019    Procedure: HIATAL HERNIA REPAIR LAPAROSCOPIC;  Surgeon: Narendra Jacobs MD;  Location:  [facility];  Service: General   • HYSTERECTOMY  2018    vaginal, partial   • LAPAROSCOPIC CHOLECYSTECTOMY  04/13/2011    Dr. Jacobs     Outpatient Medications Marked as Taking for the 12/26/19 encounter (Office Visit) with Dori Patel PA-C   Medication Sig Dispense Refill   • dexlansoprazole (DEXILANT) 60 MG capsule Take 1 capsule by mouth Daily. 90 capsule 3   • escitalopram (LEXAPRO) 20 MG tablet Take 20 mg by mouth Daily.     • HYDROcodone-acetaminophen (NORCO) 7.5-325 MG per tablet Take 1 tablet by mouth Every 4 (Four) Hours As Needed for Moderate Pain  for up to 10 days. 12 tablet 0   • metFORMIN (GLUCOPHAGE) 1000 MG tablet Take 1,000 mg by mouth 2 (Two) Times a Day With Meals.     •  "Multiple Vitamin (MULTI VITAMIN PO) Take 1 tablet by mouth Daily.     • ondansetron (ZOFRAN) 4 MG tablet Take 1 tablet by mouth Every 4 (Four) Hours As Needed for Nausea. 20 tablet 0   • Thyroid 60 MG PO tablet Take 45 mg by mouth Daily.       Allergies   Allergen Reactions   • Fish-Derived Products Anaphylaxis   • Peanut-Containing Drug Products Anaphylaxis   • Amoxil [Amoxicillin] GI Intolerance   • Lovenox [Enoxaparin] Rash   • Sulfa Antibiotics Rash       Social History     Socioeconomic History   • Marital status:      Spouse name: Not on file   • Number of children: Not on file   • Years of education: Not on file   • Highest education level: Not on file   Tobacco Use   • Smoking status: Never Smoker   • Smokeless tobacco: Never Used   Substance and Sexual Activity   • Alcohol use: No     Frequency: Never   • Drug use: No   • Sexual activity: Defer   Social History Narrative    Lives in [facility] with  and 2 daughters. Works as .        /74 (BP Location: Right arm, Patient Position: Sitting, Cuff Size: Adult)   Pulse 75   Temp 97.5 °F (36.4 °C) (Temporal)   Resp 18   Ht 161.3 cm (63.5\")   Wt 77.6 kg (171 lb)   SpO2 99%   BMI 29.82 kg/m²   Physical Exam   Constitutional: She is oriented to person, place, and time. She appears well-developed and well-nourished.   HENT:   Head: Normocephalic and atraumatic.   Cardiovascular: Normal rate, regular rhythm and normal heart sounds.   Pulmonary/Chest: Effort normal and breath sounds normal. No respiratory distress. She has no wheezes.   Abdominal: Soft. Bowel sounds are normal. She exhibits no distension. There is no tenderness.   Incisions healing well   Neurological: She is alert and oriented to person, place, and time.   Skin: Skin is warm and dry.   Psychiatric: She has a normal mood and affect. Her behavior is normal. Judgment and thought content normal.     Assessment:   POD #7 s/p lap Afshan en Y GJ + lap recurrent HHR " with Dr. Jacobs 12/19/19, previously s/p LSG with Dr. Jacobs 7/8/10, s/p lap courtney/ intraop cholangiogram/ liver biopsy (no abnormality) with Dr. Jacobs 4/13/11, s/p lap HHR with partial gastrectomy (for outpouching of the cardia on the greater curvature side) by Dr. Jacobs 7/24/15.    ICD-10-CM ICD-9-CM   1. Status post bariatric surgery Z98.84 V45.86   2. Hiatal hernia K44.9 553.3   3. Fatigue, unspecified type R53.83 780.79   4. Abdominal pain, unspecified abdominal location R10.9 789.00     Plan:  Doing ok, will check labs today. Will discuss ASA use with Dr. Jacobs.  Discussed expectations with GJ/ HHR, gave manual to follow diet progression, start vitamins.   Increase protein intake to 100g/day.  Increase exercise/activity as tolerated.  Continue PPI.  Continue to avoid NSAIDs/tramadol/tobacco x 6 weeks postop, steroids x 8 weeks postop.  Call w/ problems/concerns.    The patient was instructed to follow up in 3 weeks, sooner if needed.

## 2019-12-26 NOTE — OUTREACH NOTE
General Surgery Week 1 Survey      Responses   Facility patient discharged from?  [facility]   Does the patient have one of the following disease processes/diagnoses(primary or secondary)?  General Surgery   Is there a successful TCM telephone encounter documented?  No   Week 1 attempt successful?  Yes   Call start time  1733   Rescheduled  Revoked   Call end time  1733          Dorothea Toro RN

## 2019-12-27 LAB
ALBUMIN SERPL-MCNC: 4.3 G/DL (ref 3.5–5.5)
ALBUMIN/GLOB SERPL: 1.7 {RATIO} (ref 1.2–2.2)
ALP SERPL-CCNC: 129 IU/L (ref 39–117)
ALT SERPL-CCNC: 43 IU/L (ref 0–32)
AST SERPL-CCNC: 28 IU/L (ref 0–40)
BASOPHILS # BLD AUTO: 0.1 X10E3/UL (ref 0–0.2)
BASOPHILS NFR BLD AUTO: 1 %
BILIRUB SERPL-MCNC: 0.3 MG/DL (ref 0–1.2)
BUN SERPL-MCNC: 10 MG/DL (ref 6–24)
BUN/CREAT SERPL: 18 (ref 9–23)
CALCIUM SERPL-MCNC: 9.7 MG/DL (ref 8.7–10.2)
CHLORIDE SERPL-SCNC: 102 MMOL/L (ref 96–106)
CO2 SERPL-SCNC: 24 MMOL/L (ref 20–29)
CREAT SERPL-MCNC: 0.56 MG/DL (ref 0.57–1)
EOSINOPHIL # BLD AUTO: 0.6 X10E3/UL (ref 0–0.4)
EOSINOPHIL NFR BLD AUTO: 5 %
ERYTHROCYTE [DISTWIDTH] IN BLOOD BY AUTOMATED COUNT: 15.7 % (ref 12.3–15.4)
GLOBULIN SER CALC-MCNC: 2.6 G/DL (ref 1.5–4.5)
GLUCOSE SERPL-MCNC: 142 MG/DL (ref 65–99)
HCT VFR BLD AUTO: 40.8 % (ref 34–46.6)
HGB BLD-MCNC: 13.4 G/DL (ref 11.1–15.9)
IMM GRANULOCYTES # BLD AUTO: 0 X10E3/UL (ref 0–0.1)
IMM GRANULOCYTES NFR BLD AUTO: 0 %
LYMPHOCYTES # BLD AUTO: 1.9 X10E3/UL (ref 0.7–3.1)
LYMPHOCYTES NFR BLD AUTO: 17 %
MAGNESIUM SERPL-MCNC: 2.2 MG/DL (ref 1.6–2.3)
MCH RBC QN AUTO: 29.3 PG (ref 26.6–33)
MCHC RBC AUTO-ENTMCNC: 32.8 G/DL (ref 31.5–35.7)
MCV RBC AUTO: 89 FL (ref 79–97)
MONOCYTES # BLD AUTO: 0.8 X10E3/UL (ref 0.1–0.9)
MONOCYTES NFR BLD AUTO: 7 %
NEUTROPHILS # BLD AUTO: 8.3 X10E3/UL (ref 1.4–7)
NEUTROPHILS NFR BLD AUTO: 70 %
PHOSPHATE SERPL-MCNC: 3.7 MG/DL (ref 2.5–4.5)
PLATELET # BLD AUTO: 461 X10E3/UL (ref 150–450)
POTASSIUM SERPL-SCNC: 4.9 MMOL/L (ref 3.5–5.2)
PREALB SERPL-MCNC: 18 MG/DL (ref 12–34)
PROT SERPL-MCNC: 6.9 G/DL (ref 6–8.5)
RBC # BLD AUTO: 4.57 X10E6/UL (ref 3.77–5.28)
SODIUM SERPL-SCNC: 142 MMOL/L (ref 134–144)
WBC # BLD AUTO: 11.7 X10E3/UL (ref 3.4–10.8)

## 2020-01-17 ENCOUNTER — OFFICE VISIT (OUTPATIENT)
Dept: BARIATRICS/WEIGHT MGMT | Facility: CLINIC | Age: 50
End: 2020-01-17

## 2020-01-17 VITALS
TEMPERATURE: 96.8 F | WEIGHT: 166 LBS | BODY MASS INDEX: 28.34 KG/M2 | RESPIRATION RATE: 18 BRPM | DIASTOLIC BLOOD PRESSURE: 60 MMHG | OXYGEN SATURATION: 99 % | SYSTOLIC BLOOD PRESSURE: 112 MMHG | HEART RATE: 66 BPM | HEIGHT: 64 IN

## 2020-01-17 DIAGNOSIS — Z13.0 SCREENING, IRON DEFICIENCY ANEMIA: ICD-10-CM

## 2020-01-17 DIAGNOSIS — K91.2 POSTGASTRECTOMY MALABSORPTION: ICD-10-CM

## 2020-01-17 DIAGNOSIS — K90.9 INTESTINAL MALABSORPTION, UNSPECIFIED TYPE: ICD-10-CM

## 2020-01-17 DIAGNOSIS — Z90.3 POSTGASTRECTOMY MALABSORPTION: ICD-10-CM

## 2020-01-17 DIAGNOSIS — E55.9 HYPOVITAMINOSIS D: ICD-10-CM

## 2020-01-17 DIAGNOSIS — Z13.21 MALNUTRITION SCREEN: ICD-10-CM

## 2020-01-17 DIAGNOSIS — R53.83 FATIGUE, UNSPECIFIED TYPE: ICD-10-CM

## 2020-01-17 DIAGNOSIS — Z98.84 STATUS POST BARIATRIC SURGERY: Primary | ICD-10-CM

## 2020-01-17 PROCEDURE — 99024 POSTOP FOLLOW-UP VISIT: CPT | Performed by: PHYSICIAN ASSISTANT

## 2020-01-17 NOTE — PROGRESS NOTES
[facility] Bariatric Surgery  [address]  [address]  [facility] [id number] [phone]      Patient Name:  Conchita Acevedo.  :  1970      Reason for Visit:  1 month postop GJ/ rec HHR    HPI:  Conchita Acevedo is a 49 y.o. female s/p lap Afshan en Y GJ + lap recurrent HHR with Dr. Jacobs 19, previously s/p LSG with Dr. Jacobs 7/8/10, s/p lap courtney/ intraop cholangiogram/ liver biopsy (no abnormality) with Dr. Jacobs 11, s/p lap HHR with partial gastrectomy (for outpouching of the cardia on the greater curvature side) by Dr. Jacobs 7/24/15.       POD#1- some contrast going down native sleeve, majority preferentially going down GJ.     Doing well. Much better than 1 week follow up, presurgery symptoms resolved. Feeling really good.  No GI issues/concerns. Bowels are looser and more frequent.  Denies dysphagia, reflux, nausea, vomiting, abdominal pain, pulmonary issues and fevers.  Tolerating diet progression - on stage 5.  Getting 60-100g prot/day.  Drinking 64+ fluid oz/day, water, coffee.  Taking MVI, B12, iron and folic acid, C.  On dexilant.  Still holding ASA , NSAIDs , Tramadol, Hormones, Diuretics , Steroids and Immunologics.  Walking, active.     Presurgery weight:  222 pounds. Today's weight is 75.3 kg (166 lb) pounds, today's Body mass index is 28.94 kg/m²., and her weight loss since surgery is 56 pounds.       Past Medical History:   Diagnosis Date   • Acid reflux    • Anxiety    • Diabetes mellitus (CMS/HCC)    • Hernia, hiatal    • Hypothyroidism    • MRSA infection     incision from back surgery BELOW belly button per patient   • PCOS (polycystic ovarian syndrome)      Past Surgical History:   Procedure Laterality Date   • ANTERIOR LUMBAR FUSION W/ FRA     • BLADDER SLING MODIFIED, ANTERIOR AND POSTERIOR VAGINAL REPAIR     • ENDOSCOPY  2016     Dr. Jacobs, some mild diffuse gastritis,   • ENDOSCOPY  2016    Dr. Jacobs, mild gastrits  and large surture at GE junction with surrounding foreign body reaction, suture was removed, no obvious HH, sleeve was fairly unremarkable. Distal esphageal bx revealsed ulcerative esophagitis with candida and gram + cocci.    • ENDOSCOPY N/A 12/19/2019    Procedure: ESOPHAGOGASTRODUODENOSCOPY;  Surgeon: Narendra Jacobs MD;  Location:  [facility];  Service: General   • GASTRIC SLEEVE LAPAROSCOPIC  07/08/2010   • GASTROJEJUNOSTOMY N/A 12/19/2019    Procedure: GASTROJEJUNOSTOMY LAPAROSCOPIC;  Surgeon: Narendra Jacobs MD;  Location:  [facility];  Service: General   • HIATAL HERNIA REPAIR  07/2015    Dr. Jacobs, [facility] partial gastectomy for outpouching of cardia   • HIATAL HERNIA REPAIR N/A 12/19/2019    Procedure: HIATAL HERNIA REPAIR LAPAROSCOPIC;  Surgeon: Narendra Jacobs MD;  Location:  [facility];  Service: General   • HYSTERECTOMY  2018    vaginal, partial   • LAPAROSCOPIC CHOLECYSTECTOMY  04/13/2011    Dr. Jacobs     Outpatient Medications Marked as Taking for the 1/17/20 encounter (Office Visit) with Dori Patel PA-C   Medication Sig Dispense Refill   • Barberry-Oreg Grape-Goldenseal (BERBERINE COMPLEX PO) Take 1 tablet by mouth 2 (Two) Times a Day.     • dexlansoprazole (DEXILANT) 60 MG capsule Take 1 capsule by mouth Daily. 90 capsule 3   • escitalopram (LEXAPRO) 20 MG tablet Take 20 mg by mouth Daily.     • metFORMIN (GLUCOPHAGE) 1000 MG tablet Take 1,000 mg by mouth 2 (Two) Times a Day With Meals.     • Multiple Vitamin (MULTI VITAMIN PO) Take 1 tablet by mouth Daily.     • Thyroid 60 MG PO tablet Take 45 mg by mouth Daily.       Allergies   Allergen Reactions   • Fish-Derived Products Anaphylaxis   • Peanut-Containing Drug Products Anaphylaxis   • Amoxil [Amoxicillin] GI Intolerance   • Lovenox [Enoxaparin] Rash   • Sulfa Antibiotics Rash       Social History     Socioeconomic History   • Marital status:      Spouse name: Not on file   • Number of children: Not on file   • Years of  "education: Not on file   • Highest education level: Not on file   Tobacco Use   • Smoking status: Never Smoker   • Smokeless tobacco: Never Used   Substance and Sexual Activity   • Alcohol use: No     Frequency: Never   • Drug use: No   • Sexual activity: Defer   Social History Narrative    Lives in [facility] with  and 2 daughters. Works as .        /60 (BP Location: Left arm, Patient Position: Sitting, Cuff Size: Large Adult)   Pulse 66   Temp 96.8 °F (36 °C) (Temporal)   Resp 18   Ht 161.3 cm (63.5\")   Wt 75.3 kg (166 lb)   SpO2 99%   BMI 28.94 kg/m²     Physical Exam   Constitutional: She is oriented to person, place, and time. She appears well-developed and well-nourished.   HENT:   Head: Normocephalic and atraumatic.   Cardiovascular: Normal rate, regular rhythm and normal heart sounds.   Pulmonary/Chest: Effort normal and breath sounds normal. No respiratory distress. She has no wheezes.   Abdominal: Soft. Bowel sounds are normal. She exhibits no distension. There is no tenderness.   Incisions healing well   Neurological: She is alert and oriented to person, place, and time.   Skin: Skin is warm and dry.   Psychiatric: She has a normal mood and affect. Her behavior is normal. Judgment and thought content normal.         Assessment:  1 month s/p lap Afshan en Y GJ + lap recurrent HHR with Dr. Jacobs 12/19/19, previously s/p LSG with Dr. Jacobs 7/8/10, s/p lap courtney/ intraop cholangiogram/ liver biopsy (no abnormality) with Dr. Jacobs 4/13/11, s/p lap HHR with partial gastrectomy (for outpouching of the cardia on the greater curvature side) by Dr. Jacobs 7/24/15.    ICD-10-CM ICD-9-CM   1. Status post bariatric surgery Z98.84 V45.86   2. Fatigue, unspecified type R53.83 780.79   3. Screening, iron deficiency anemia Z13.0 V78.0   4. Malnutrition screen Z13.21 V77.2   5. Hypovitaminosis D E55.9 268.9   6. Intestinal malabsorption, unspecified type K90.9 579.9   7. Postgastrectomy " malabsorption K91.2 579.3    Z90.3          Plan:  Doing well.  Continue to advance diet per manual.  Continue protein 70-100g/day.  Encouraged good food choices - high protein, low carb.  Continue fluids 64oz per day. Continue routine exercise, lifting restrictions lifted.  Continue PPI. Continue to avoid NSAIDS, ASA, tramadol, tobacco x 6 weeks postop, steroids x 8 weeks postop.  Routine bariatric labs ordered.  Continue vitamins w/ adjustments pending lab results.  Call w/ problems/concerns.    The patient was instructed to follow up in 2 months, sooner if needed.

## 2020-01-22 LAB
25(OH)D3+25(OH)D2 SERPL-MCNC: 45 NG/ML (ref 30–100)
A-TOCOPHEROL VIT E SERPL-MCNC: 12.9 MG/L (ref 7–25.1)
ALBUMIN SERPL-MCNC: 4.7 G/DL (ref 3.5–5.5)
ALBUMIN/GLOB SERPL: 2.4 {RATIO} (ref 1.2–2.2)
ALP SERPL-CCNC: 98 IU/L (ref 39–117)
ALT SERPL-CCNC: 16 IU/L (ref 0–32)
AST SERPL-CCNC: 16 IU/L (ref 0–40)
BASOPHILS # BLD AUTO: 0 X10E3/UL (ref 0–0.2)
BASOPHILS NFR BLD AUTO: 0 %
BILIRUB SERPL-MCNC: 0.6 MG/DL (ref 0–1.2)
BUN SERPL-MCNC: 9 MG/DL (ref 6–24)
BUN/CREAT SERPL: 12 (ref 9–23)
CALCIUM SERPL-MCNC: 9.5 MG/DL (ref 8.7–10.2)
CHLORIDE SERPL-SCNC: 100 MMOL/L (ref 96–106)
CO2 SERPL-SCNC: 22 MMOL/L (ref 20–29)
CREAT SERPL-MCNC: 0.78 MG/DL (ref 0.57–1)
EOSINOPHIL # BLD AUTO: 0.3 X10E3/UL (ref 0–0.4)
EOSINOPHIL NFR BLD AUTO: 4 %
ERYTHROCYTE [DISTWIDTH] IN BLOOD BY AUTOMATED COUNT: 15.3 % (ref 11.7–15.4)
FERRITIN SERPL-MCNC: 34 NG/ML (ref 15–150)
FOLATE SERPL-MCNC: >20 NG/ML
GAMMA TOCOPHEROL SERPL-MCNC: 0.3 MG/L (ref 0.5–5.5)
GLOBULIN SER CALC-MCNC: 2 G/DL (ref 1.5–4.5)
GLUCOSE SERPL-MCNC: 101 MG/DL (ref 65–99)
HCT VFR BLD AUTO: 41 % (ref 34–46.6)
HGB BLD-MCNC: 13.5 G/DL (ref 11.1–15.9)
IMM GRANULOCYTES # BLD AUTO: 0 X10E3/UL (ref 0–0.1)
IMM GRANULOCYTES NFR BLD AUTO: 0 %
IRON SERPL-MCNC: 79 UG/DL (ref 27–159)
LYMPHOCYTES # BLD AUTO: 2.9 X10E3/UL (ref 0.7–3.1)
LYMPHOCYTES NFR BLD AUTO: 33 %
Lab: NORMAL
MAGNESIUM SERPL-MCNC: 2.3 MG/DL (ref 1.6–2.3)
MCH RBC QN AUTO: 29.4 PG (ref 26.6–33)
MCHC RBC AUTO-ENTMCNC: 32.9 G/DL (ref 31.5–35.7)
MCV RBC AUTO: 89 FL (ref 79–97)
METHYLMALONATE SERPL-SCNC: 156 NMOL/L (ref 0–378)
MONOCYTES # BLD AUTO: 0.6 X10E3/UL (ref 0.1–0.9)
MONOCYTES NFR BLD AUTO: 7 %
NEUTROPHILS # BLD AUTO: 4.9 X10E3/UL (ref 1.4–7)
NEUTROPHILS NFR BLD AUTO: 56 %
PHOSPHATE SERPL-MCNC: 3.8 MG/DL (ref 3–4.3)
PLATELET # BLD AUTO: 347 X10E3/UL (ref 150–450)
POTASSIUM SERPL-SCNC: 5.1 MMOL/L (ref 3.5–5.2)
PREALB SERPL-MCNC: 24 MG/DL (ref 12–34)
PROT SERPL-MCNC: 6.7 G/DL (ref 6–8.5)
PTH-INTACT SERPL-MCNC: 23 PG/ML (ref 15–65)
RBC # BLD AUTO: 4.59 X10E6/UL (ref 3.77–5.28)
SODIUM SERPL-SCNC: 140 MMOL/L (ref 134–144)
VIT A SERPL-MCNC: 41.1 UG/DL (ref 20.1–62)
VIT B1 BLD-SCNC: 148.2 NMOL/L (ref 66.5–200)
WBC # BLD AUTO: 8.8 X10E3/UL (ref 3.4–10.8)
ZINC SERPL-MCNC: 108 UG/DL (ref 56–134)

## 2020-02-17 ENCOUNTER — TELEPHONE (OUTPATIENT)
Dept: BARIATRICS/WEIGHT MGMT | Facility: CLINIC | Age: 50
End: 2020-02-17

## 2020-02-17 NOTE — TELEPHONE ENCOUNTER
Notified pt that she needs to come in the office for further eval. And you would like for her to come in at 330pm 2/18/2020. Pt verbalized understanding, and stated that she would be here. Pt added on the schedule.

## 2020-02-17 NOTE — TELEPHONE ENCOUNTER
Pt called in stating that she is having constant pain in her abdomen/at the top of her sternum. Pt is having severe nausea, but no vomiting, but did state she feels like she could at times but fights the feeling off. Pt stated that her appetite has decreased, and she is basically eating mushy foods. Pt stated that she is able to get in 80-90 grams of protein and 64+ ozs fluids. Pt denies: F/C. Please advise, thank you.

## 2020-02-18 ENCOUNTER — OFFICE VISIT (OUTPATIENT)
Dept: BARIATRICS/WEIGHT MGMT | Facility: CLINIC | Age: 50
End: 2020-02-18

## 2020-02-18 VITALS
RESPIRATION RATE: 18 BRPM | SYSTOLIC BLOOD PRESSURE: 112 MMHG | TEMPERATURE: 95.8 F | WEIGHT: 163.5 LBS | HEART RATE: 74 BPM | OXYGEN SATURATION: 98 % | HEIGHT: 64 IN | BODY MASS INDEX: 27.91 KG/M2 | DIASTOLIC BLOOD PRESSURE: 70 MMHG

## 2020-02-18 DIAGNOSIS — R11.0 NAUSEA: Primary | ICD-10-CM

## 2020-02-18 DIAGNOSIS — R10.13 DYSPEPSIA: ICD-10-CM

## 2020-02-18 DIAGNOSIS — R19.7 DIARRHEA, UNSPECIFIED TYPE: ICD-10-CM

## 2020-02-18 DIAGNOSIS — R10.13 EPIGASTRIC PAIN: ICD-10-CM

## 2020-02-18 PROCEDURE — 99024 POSTOP FOLLOW-UP VISIT: CPT | Performed by: PHYSICIAN ASSISTANT

## 2020-02-18 NOTE — PROGRESS NOTES
[facility] Bariatric Surgery  [address]  [address]  [facility] [id number] [phone]        Patient Name: Conchita Acevedo.  YOB: 1970      Date of Visit: 02/18/2020      Reason for Visit:  nausea, epigastric pain, recurrent reflux    HPI:  Conchita Acevedo is a 49 y.o. female 2 months s/p lap GJ w/ recurrent HHR 12/19/19 GDW (hx LSG 2010)    LOV @ 1 month postop was feeling really good.  Presurgery sx (dysphagia, belching, reflux/regurg) had resolved.    Called yesterday c/o constant epigastric pain, severe nausea, decreased appetite, inability to tolerate solid foods.    Says started w/ intermittent nausea 10+days ago, but since the weekend has had constant nausea w/ associated epigastric pain unrelated to PO intake.  Very poor appetite since.  Today started burping and having uncontrolled reflux, despite taking daily Dexilant.  Feels the same as prior to surgery at this point.  Has not tried any antiemetics.  Has had 2-4 loose watery nonbloody stools since recent surgery.  Seeing undigested foods in her stool (lettuce, tomatoes).  Prior to her GJ was having solid stools q1-2x/day.  No recent stool studies.  No fevers/chills.  No known ill contacts.    Last labs 1/17/20 - WNL.  Last UGI on POD#1 - no leak or obstruction, some contrast going down native sleeve.    Denies NSAID/steroid/tobacco use/exposure.  Did start back on ASA 81 mg 6 wks postop, but stopped once she started having these most recent issues.      PreLSG weight: 222 pounds. Today's weight is 74.2 kg (163 lb 8 oz) pounds, today's  Body mass index is 28.51 kg/m²., and her weight loss since her initial surgery is 59 pounds at this point.         Past Medical History:   Diagnosis Date   • Anxiety    • Diabetes mellitus (CMS/HCC)    • Dyspepsia    • Fatigue    • GERD (gastroesophageal reflux disease)     on Dexilant   • Hypothyroidism    • MRSA infection 2015    incision from back surgery BELOW  belly button per patient   • PCOS (polycystic ovarian syndrome)      Past Surgical History:   Procedure Laterality Date   • ANTERIOR LUMBAR FUSION W/ FRA  2015   • BLADDER SLING MODIFIED, ANTERIOR AND POSTERIOR VAGINAL REPAIR  2016   • ENDOSCOPY  12/19/2016     Dr. Jacobs, some mild diffuse gastritis,   • ENDOSCOPY  08/03/2016    Dr. Jacobs, mild gastrits and large surture at GE junction with surrounding foreign body reaction, suture was removed, no obvious HH, sleeve was fairly unremarkable. Distal esphageal bx revealsed ulcerative esophagitis with candida and gram + cocci.    • ENDOSCOPY N/A 12/19/2019    Procedure: ESOPHAGOGASTRODUODENOSCOPY;  Surgeon: Narendra Jacobs MD;  Location:  [facility];  Service: General   • GASTRIC SLEEVE LAPAROSCOPIC  07/08/2010    w. Dr. Jacobs   • GASTROJEJUNOSTOMY N/A 12/19/2019    Procedure: GASTROJEJUNOSTOMY LAPAROSCOPIC;  Surgeon: Narendra Jacobs MD;  Location:  [facility];  Service: General   • HIATAL HERNIA REPAIR  07/2015    Dr. Jacobs, [facility] partial gastectomy for outpouching of cardia   • HIATAL HERNIA REPAIR N/A 12/19/2019    Procedure: HIATAL HERNIA REPAIR LAPAROSCOPIC;  Surgeon: Narendra Jacobs MD;  Location:  [facility];  Service: General   • HYSTERECTOMY  2018    vaginal, partial   • LAPAROSCOPIC CHOLECYSTECTOMY  04/13/2011    Dr. Jacobs     Outpatient Medications Marked as Taking for the 2/18/20 encounter (Office Visit) with Nanci Arias PA   Medication Sig Dispense Refill   • dexlansoprazole (DEXILANT) 60 MG capsule Take 1 capsule by mouth Daily. 90 capsule 3   • escitalopram (LEXAPRO) 20 MG tablet Take 20 mg by mouth Daily.     • metFORMIN (GLUCOPHAGE) 1000 MG tablet Take 1,000 mg by mouth 2 (Two) Times a Day With Meals.     • Multiple Vitamin (MULTI VITAMIN PO) Take 1 tablet by mouth Daily.     • Thyroid 60 MG PO tablet Take 45 mg by mouth Daily.       Allergies   Allergen Reactions   • Fish-Derived Products Anaphylaxis   • Peanut-Containing Drug Products  Anaphylaxis   • Amoxil [Amoxicillin] GI Intolerance   • Lovenox [Enoxaparin] Rash   • Sulfa Antibiotics Rash       Social History     Socioeconomic History   • Marital status:      Spouse name: Not on file   • Number of children: Not on file   • Years of education: Not on file   • Highest education level: Not on file   Tobacco Use   • Smoking status: Never Smoker   • Smokeless tobacco: Never Used   Substance and Sexual Activity   • Alcohol use: No     Frequency: Never   • Drug use: No   • Sexual activity: Defer   Social History Narrative    Lives in [place] with  and 2 daughters. Works as .        Vitals:    02/18/20 1529   BP: 112/70   Pulse: 74   Resp: 18   Temp: 95.8 °F (35.4 °C)   SpO2: 98%     Weight 74.2 kg (163 lb 8 oz)  Body mass index is 28.51 kg/m².    Physical Exam   Constitutional: She appears well-developed and well-nourished. She is cooperative.   HENT:   Mouth/Throat: Oropharynx is clear and moist and mucous membranes are normal.   Eyes: Conjunctivae are normal. No scleral icterus.   Cardiovascular: Normal rate.   Pulmonary/Chest: Effort normal.   Abdominal: Soft. Bowel sounds are normal. She exhibits no distension. There is no rebound and no guarding.   epigastric TTP, lap incisions well healed   Musculoskeletal: Normal range of motion. She exhibits no edema.   Neurological: She is alert.   Skin: Skin is warm and dry. No rash noted.   Psychiatric: She has a normal mood and affect. Judgment normal.         Assessment:  2 months s/p lap GJ w/ recurrent HHR 12/19/19 GDW ( LS 2010)    ICD-10-CM ICD-9-CM   1. Nausea R11.0 787.02   2. Dyspepsia R10.13 536.8   3. Epigastric pain R10.13 789.06   4. Diarrhea, unspecified type R19.7 787.91       Plan:  Discussed w/ Dr. Jacobs.  Labs, stool studies, UGI and EGD ordered to further evaluate.  Continue PPI.  Continue to avoid ASA/NSAIDS/steroids/tobacco.  Use Zofran prn.  Call w/ worsening issues.

## 2020-02-19 LAB
ALBUMIN SERPL-MCNC: 4.5 G/DL (ref 3.8–4.8)
ALBUMIN/GLOB SERPL: 2 {RATIO} (ref 1.2–2.2)
ALP SERPL-CCNC: 107 IU/L (ref 39–117)
ALT SERPL-CCNC: 25 IU/L (ref 0–32)
AMYLASE SERPL-CCNC: 39 U/L (ref 31–110)
AST SERPL-CCNC: 17 IU/L (ref 0–40)
BASOPHILS # BLD AUTO: 0 X10E3/UL (ref 0–0.2)
BASOPHILS NFR BLD AUTO: 0 %
BILIRUB SERPL-MCNC: 0.3 MG/DL (ref 0–1.2)
BUN SERPL-MCNC: 9 MG/DL (ref 6–24)
BUN/CREAT SERPL: 12 (ref 9–23)
CALCIUM SERPL-MCNC: 9.1 MG/DL (ref 8.7–10.2)
CHLORIDE SERPL-SCNC: 101 MMOL/L (ref 96–106)
CO2 SERPL-SCNC: 25 MMOL/L (ref 20–29)
CREAT SERPL-MCNC: 0.76 MG/DL (ref 0.57–1)
EOSINOPHIL # BLD AUTO: 0.1 X10E3/UL (ref 0–0.4)
EOSINOPHIL NFR BLD AUTO: 1 %
ERYTHROCYTE [DISTWIDTH] IN BLOOD BY AUTOMATED COUNT: 14.4 % (ref 11.7–15.4)
GLOBULIN SER CALC-MCNC: 2.3 G/DL (ref 1.5–4.5)
GLUCOSE SERPL-MCNC: 125 MG/DL (ref 65–99)
HCT VFR BLD AUTO: 42.3 % (ref 34–46.6)
HGB BLD-MCNC: 14 G/DL (ref 11.1–15.9)
IMM GRANULOCYTES # BLD AUTO: 0 X10E3/UL (ref 0–0.1)
IMM GRANULOCYTES NFR BLD AUTO: 0 %
LIPASE SERPL-CCNC: 23 U/L (ref 14–72)
LYMPHOCYTES # BLD AUTO: 3 X10E3/UL (ref 0.7–3.1)
LYMPHOCYTES NFR BLD AUTO: 28 %
MAGNESIUM SERPL-MCNC: 2.2 MG/DL (ref 1.6–2.3)
MCH RBC QN AUTO: 30.1 PG (ref 26.6–33)
MCHC RBC AUTO-ENTMCNC: 33.1 G/DL (ref 31.5–35.7)
MCV RBC AUTO: 91 FL (ref 79–97)
MONOCYTES # BLD AUTO: 0.7 X10E3/UL (ref 0.1–0.9)
MONOCYTES NFR BLD AUTO: 6 %
NEUTROPHILS # BLD AUTO: 7 X10E3/UL (ref 1.4–7)
NEUTROPHILS NFR BLD AUTO: 65 %
PHOSPHATE SERPL-MCNC: 3.6 MG/DL (ref 3–4.3)
PLATELET # BLD AUTO: 382 X10E3/UL (ref 150–450)
POTASSIUM SERPL-SCNC: 5.1 MMOL/L (ref 3.5–5.2)
PROT SERPL-MCNC: 6.8 G/DL (ref 6–8.5)
RBC # BLD AUTO: 4.65 X10E6/UL (ref 3.77–5.28)
SODIUM SERPL-SCNC: 141 MMOL/L (ref 134–144)
WBC # BLD AUTO: 10.8 X10E3/UL (ref 3.4–10.8)

## 2020-03-04 ENCOUNTER — APPOINTMENT (OUTPATIENT)
Dept: GENERAL RADIOLOGY | Facility: HOSPITAL | Age: 50
End: 2020-03-04

## 2020-03-09 ENCOUNTER — HOSPITAL ENCOUNTER (OUTPATIENT)
Dept: GENERAL RADIOLOGY | Facility: HOSPITAL | Age: 50
Discharge: HOME | End: 2020-03-09
Admitting: PHYSICIAN ASSISTANT

## 2020-03-09 DIAGNOSIS — R11.0 NAUSEA: ICD-10-CM

## 2020-03-09 DIAGNOSIS — R10.13 EPIGASTRIC PAIN: ICD-10-CM

## 2020-03-09 DIAGNOSIS — R10.13 DYSPEPSIA: ICD-10-CM

## 2020-03-09 PROCEDURE — 74240 X-RAY XM UPR GI TRC 1CNTRST: CPT

## 2020-03-09 PROCEDURE — 74240 X-RAY XM UPR GI TRC 1CNTRST: CPT | Performed by: RADIOLOGY

## 2020-03-11 DIAGNOSIS — R19.7 DIARRHEA, UNSPECIFIED TYPE: ICD-10-CM

## 2020-03-27 RX ORDER — ONDANSETRON 4 MG/1
4 TABLET, FILM COATED ORAL EVERY 4 HOURS PRN
Qty: 30 TABLET | Refills: 0 | Status: SHIPPED | OUTPATIENT
Start: 2020-03-27 | End: 2021-08-02

## 2020-08-31 ENCOUNTER — TELEMEDICINE (OUTPATIENT)
Dept: BARIATRICS/WEIGHT MGMT | Facility: CLINIC | Age: 50
End: 2020-08-31

## 2020-08-31 VITALS — BODY MASS INDEX: 28.94 KG/M2 | WEIGHT: 166 LBS

## 2020-08-31 DIAGNOSIS — Z13.0 SCREENING, IRON DEFICIENCY ANEMIA: ICD-10-CM

## 2020-08-31 DIAGNOSIS — K91.2 POSTGASTRECTOMY MALABSORPTION: ICD-10-CM

## 2020-08-31 DIAGNOSIS — Z90.3 POSTGASTRECTOMY MALABSORPTION: ICD-10-CM

## 2020-08-31 DIAGNOSIS — Z13.21 MALNUTRITION SCREEN: ICD-10-CM

## 2020-08-31 DIAGNOSIS — Z98.84 STATUS POST BARIATRIC SURGERY: Primary | ICD-10-CM

## 2020-08-31 DIAGNOSIS — R53.83 FATIGUE, UNSPECIFIED TYPE: ICD-10-CM

## 2020-08-31 DIAGNOSIS — E55.9 HYPOVITAMINOSIS D: ICD-10-CM

## 2020-08-31 DIAGNOSIS — R19.7 DIARRHEA, UNSPECIFIED TYPE: ICD-10-CM

## 2020-08-31 PROCEDURE — 99214 OFFICE O/P EST MOD 30 MIN: CPT | Performed by: PHYSICIAN ASSISTANT

## 2020-08-31 NOTE — PROGRESS NOTES
[facility] Bariatric Surgery  [address]  [address]  [facility] [id number] [phone]        Patient Name:  Conchita Acevedo.  :  1970        Reason for Visit:  8 months postop GJ    HPI: Conchita Acevedo is a 50 y.o. female s/p lap GJ w/ recurrent HHR 19 GDW (hx LSG )    This was an audio and video enabled telemedicine encounter due to covid-19 pandemic, patient consents.    LOV with c/o nausea, uncontrolled reflux, loose stools.  Had UGI 3/9/20- looked fine. EGD planned but not completed due to COVID.  She did not complete stool studies as ordered.     Doing well. Nausea is much less frequent, a couple days a month, takes zofran as needed.  Continues with  BM mult times a day, varied loose/ solid/ constipation.  A lot of gas. Reflux is controlled with dexilant.   Seeing integrative hormone specialist, recommended food sensitivity test, suspected sensitive to gluten. Since d/c gluten, has noticed  Improvement in joint pain and muscle pain. No other issues/concerns. Denies dysphagia, reflux, vomiting, abdominal pain, pulmonary issues and fevers. Did not complete stool studies.  Eating 3 times a day + snack before bed. Gluten free now. Eggs make gas worse, eating oatmeal in the morning or gluten free toast.  Veggies + protein for lunch and dinner. Rice/ potatoes occasionally.Getting 60-100g prot/day.  Drinking 64+ fluid oz/day, coffee, tea and water. .  Last labs revealed bariatric levels wnl 2020 .  Taking MVI and Vit D.  On dexilant.  Exercising- walking.     Presurgery weight: 222 pounds.  Today's weight is 75.3 kg (166 lb) pounds, today's  Body mass index is 28.94 kg/m²., and@ weight loss since surgery is 56 pounds.      Past Medical History:   Diagnosis Date   • Anxiety    • Diabetes mellitus (CMS/HCC)    • Dyspepsia    • Fatigue    • GERD (gastroesophageal reflux disease)     on Dexilant   • Hypothyroidism    • MRSA infection     incision from back  surgery BELOW belly button per patient   • PCOS (polycystic ovarian syndrome)      Past Surgical History:   Procedure Laterality Date   • ANTERIOR LUMBAR FUSION W/ FRA  2015   • BLADDER SLING MODIFIED, ANTERIOR AND POSTERIOR VAGINAL REPAIR  2016   • ENDOSCOPY  12/19/2016     Dr. Jacobs, some mild diffuse gastritis,   • ENDOSCOPY  08/03/2016    Dr. Jacobs, mild gastrits and large surture at GE junction with surrounding foreign body reaction, suture was removed, no obvious HH, sleeve was fairly unremarkable. Distal esphageal bx revealsed ulcerative esophagitis with candida and gram + cocci.    • ENDOSCOPY N/A 12/19/2019    Procedure: ESOPHAGOGASTRODUODENOSCOPY;  Surgeon: Narendra Jacobs MD;  Location:  [facility];  Service: General   • GASTRIC SLEEVE LAPAROSCOPIC  07/08/2010    w. Dr. Jacobs   • GASTROJEJUNOSTOMY N/A 12/19/2019    Procedure: GASTROJEJUNOSTOMY LAPAROSCOPIC;  Surgeon: Narendra Jacobs MD;  Location:  [facility];  Service: General   • HIATAL HERNIA REPAIR  07/2015    Dr. Jacobs, [facility] partial gastectomy for outpouching of cardia   • HIATAL HERNIA REPAIR N/A 12/19/2019    Procedure: HIATAL HERNIA REPAIR LAPAROSCOPIC;  Surgeon: Narendra Jacobs MD;  Location:  [facility];  Service: General   • HYSTERECTOMY  2018    vaginal, partial   • LAPAROSCOPIC CHOLECYSTECTOMY  04/13/2011    Dr. Jacobs     Outpatient Medications Marked as Taking for the 8/31/20 encounter (Telemedicine) with Dori Patel PA-C   Medication Sig Dispense Refill   • dexlansoprazole (DEXILANT) 60 MG capsule Take 1 capsule by mouth Daily. 90 capsule 3   • escitalopram (LEXAPRO) 20 MG tablet Take 20 mg by mouth Daily.     • metFORMIN (GLUCOPHAGE) 1000 MG tablet Take 1,000 mg by mouth 2 (Two) Times a Day With Meals.     • Multiple Vitamin (MULTI VITAMIN PO) Take 1 tablet by mouth Daily.     • ondansetron (Zofran) 4 MG tablet Take 1 tablet by mouth Every 4 (Four) Hours As Needed for Nausea. 30 tablet 0   • Thyroid 60 MG PO tablet Take 45  mg by mouth Daily.         Allergies   Allergen Reactions   • Fish-Derived Products Anaphylaxis   • Peanut-Containing Drug Products Anaphylaxis   • Amoxil [Amoxicillin] GI Intolerance   • Lovenox [Enoxaparin] Rash   • Sulfa Antibiotics Rash       Social History     Socioeconomic History   • Marital status:      Spouse name: Not on file   • Number of children: Not on file   • Years of education: Not on file   • Highest education level: Not on file   Tobacco Use   • Smoking status: Never Smoker   • Smokeless tobacco: Never Used   Substance and Sexual Activity   • Alcohol use: No     Frequency: Never   • Drug use: No   • Sexual activity: Defer   Social History Narrative    Lives in [facility] with  and 2 daughters. Works as .        Wt 75.3 kg (166 lb)   BMI 28.94 kg/m²     Physical Exam   Constitutional: She is oriented to person, place, and time. She appears well-developed and well-nourished.   HENT:   Head: Normocephalic and atraumatic.   Pulmonary/Chest: Effort normal.   Neurological: She is alert and oriented to person, place, and time.   Psychiatric: She has a normal mood and affect. Thought content normal.         Assessment:  8 months s/p lap GJ w/ recurrent HHR 12/19/19 GDW ( LSG 2010)        ICD-10-CM ICD-9-CM   1. Status post bariatric surgery Z98.84 V45.86   2. Fatigue, unspecified type R53.83 780.79   3. Malnutrition screen Z13.21 V77.2   4. Postgastrectomy malabsorption K91.2 579.3    Z90.3    5. Diarrhea, unspecified type R19.7 787.91   6. Screening, iron deficiency anemia Z13.0 V78.0   7. Hypovitaminosis D E55.9 268.9         Plan:  Complete stools studies as ordered. Nausea and reflux improved, will hold off on EGD/ eval.  Can try fiber supplements and probiotics.  Continue w/ good food choices and healthy habits.  Continue to focus on high protein, low carb.  Keep tracking intake.  Continue routine exercise.  Routine bariatric labs ordered.  Continue vitamins w/  adjustments pending lab results.  Call w/ problems/concerns.     Patient's Body mass index is 28.94 kg/m². BMI is above normal parameters. Recommendations include: exercise counseling and nutrition counseling.    The patient was instructed to follow up in 3 months, sooner if needed.    Total time spent w/ patient 25 minutes and 15 minutes spent counseling the patient on nutrition and necessary dietary/lifestyle modifications.

## 2020-09-01 DIAGNOSIS — R53.83 FATIGUE, UNSPECIFIED TYPE: ICD-10-CM

## 2020-09-01 DIAGNOSIS — Z90.3 POSTGASTRECTOMY MALABSORPTION: ICD-10-CM

## 2020-09-01 DIAGNOSIS — Z13.21 MALNUTRITION SCREEN: ICD-10-CM

## 2020-09-01 DIAGNOSIS — R19.7 DIARRHEA, UNSPECIFIED TYPE: ICD-10-CM

## 2020-09-01 DIAGNOSIS — E55.9 HYPOVITAMINOSIS D: ICD-10-CM

## 2020-09-01 DIAGNOSIS — K91.2 POSTGASTRECTOMY MALABSORPTION: ICD-10-CM

## 2020-09-01 DIAGNOSIS — Z13.0 SCREENING, IRON DEFICIENCY ANEMIA: ICD-10-CM

## 2020-09-17 LAB
25(OH)D3+25(OH)D2 SERPL-MCNC: 44.6 NG/ML (ref 30–100)
A-TOCOPHEROL VIT E SERPL-MCNC: 13.2 MG/L (ref 7–25.1)
ALBUMIN SERPL-MCNC: 4 G/DL (ref 3.8–4.8)
ALBUMIN/GLOB SERPL: 1.8 {RATIO} (ref 1.2–2.2)
ALP SERPL-CCNC: 87 IU/L (ref 39–117)
ALT SERPL-CCNC: 34 IU/L (ref 0–32)
AST SERPL-CCNC: 20 IU/L (ref 0–40)
BASOPHILS # BLD AUTO: 0 X10E3/UL (ref 0–0.2)
BASOPHILS NFR BLD AUTO: 1 %
BILIRUB SERPL-MCNC: 0.4 MG/DL (ref 0–1.2)
BUN SERPL-MCNC: 9 MG/DL (ref 6–24)
BUN/CREAT SERPL: 15 (ref 9–23)
CALCIUM SERPL-MCNC: 8.9 MG/DL (ref 8.7–10.2)
CHLORIDE SERPL-SCNC: 98 MMOL/L (ref 96–106)
CO2 SERPL-SCNC: 27 MMOL/L (ref 20–29)
CREAT SERPL-MCNC: 0.59 MG/DL (ref 0.57–1)
EOSINOPHIL # BLD AUTO: 0.1 X10E3/UL (ref 0–0.4)
EOSINOPHIL NFR BLD AUTO: 1 %
ERYTHROCYTE [DISTWIDTH] IN BLOOD BY AUTOMATED COUNT: 13 % (ref 11.7–15.4)
FERRITIN SERPL-MCNC: 12 NG/ML (ref 15–150)
FOLATE SERPL-MCNC: 13.2 NG/ML
GAMMA TOCOPHEROL SERPL-MCNC: 0.5 MG/L (ref 0.5–5.5)
GLOBULIN SER CALC-MCNC: 2.2 G/DL (ref 1.5–4.5)
GLUCOSE SERPL-MCNC: 97 MG/DL (ref 65–99)
HCT VFR BLD AUTO: 39.6 % (ref 34–46.6)
HGB BLD-MCNC: 13 G/DL (ref 11.1–15.9)
IMM GRANULOCYTES # BLD AUTO: 0 X10E3/UL (ref 0–0.1)
IMM GRANULOCYTES NFR BLD AUTO: 0 %
IRON SERPL-MCNC: 67 UG/DL (ref 27–159)
LYMPHOCYTES # BLD AUTO: 2.5 X10E3/UL (ref 0.7–3.1)
LYMPHOCYTES NFR BLD AUTO: 32 %
Lab: NORMAL
MAGNESIUM SERPL-MCNC: 2.1 MG/DL (ref 1.6–2.3)
MCH RBC QN AUTO: 30.5 PG (ref 26.6–33)
MCHC RBC AUTO-ENTMCNC: 32.8 G/DL (ref 31.5–35.7)
MCV RBC AUTO: 93 FL (ref 79–97)
METHYLMALONATE SERPL-SCNC: 138 NMOL/L (ref 0–378)
MONOCYTES # BLD AUTO: 0.4 X10E3/UL (ref 0.1–0.9)
MONOCYTES NFR BLD AUTO: 6 %
NEUTROPHILS # BLD AUTO: 4.8 X10E3/UL (ref 1.4–7)
NEUTROPHILS NFR BLD AUTO: 60 %
PHOSPHATE SERPL-MCNC: 3.9 MG/DL (ref 3–4.3)
PLATELET # BLD AUTO: 315 X10E3/UL (ref 150–450)
POTASSIUM SERPL-SCNC: 4.4 MMOL/L (ref 3.5–5.2)
PREALB SERPL-MCNC: 28 MG/DL (ref 12–34)
PROT SERPL-MCNC: 6.2 G/DL (ref 6–8.5)
PTH-INTACT SERPL-MCNC: 24 PG/ML (ref 15–65)
RBC # BLD AUTO: 4.26 X10E6/UL (ref 3.77–5.28)
SODIUM SERPL-SCNC: 140 MMOL/L (ref 134–144)
VIT A SERPL-MCNC: 52.2 UG/DL (ref 20.1–62)
VIT B1 BLD-SCNC: 146 NMOL/L (ref 66.5–200)
WBC # BLD AUTO: 7.9 X10E3/UL (ref 3.4–10.8)
ZINC SERPL-MCNC: 93 UG/DL (ref 56–134)

## 2020-09-22 LAB
BACTERIA SPEC CULT: NORMAL
BACTERIA SPEC CULT: NORMAL
C DIFF TOX A+B STL QL IA: NEGATIVE
CAMPYLOBACTER STL CULT: NORMAL
E COLI SXT STL QL IA: NEGATIVE
O+P SPEC MICRO: NORMAL
O+P STL CONC: NORMAL
SALM + SHIG STL CULT: NORMAL
WBC STL QL MICRO: NORMAL
WBC STL QL MICRO: NORMAL

## 2020-10-16 RX ORDER — DEXLANSOPRAZOLE 60 MG/1
CAPSULE, DELAYED RELEASE ORAL
Qty: 90 CAPSULE | Refills: 3 | Status: SHIPPED | OUTPATIENT
Start: 2020-10-16 | End: 2021-08-02 | Stop reason: SDUPTHER

## 2021-03-23 ENCOUNTER — BULK ORDERING (OUTPATIENT)
Dept: CASE MANAGEMENT | Facility: OTHER | Age: 51
End: 2021-03-23

## 2021-03-23 DIAGNOSIS — Z23 IMMUNIZATION DUE: ICD-10-CM

## 2021-08-02 ENCOUNTER — OFFICE VISIT (OUTPATIENT)
Dept: BARIATRICS/WEIGHT MGMT | Facility: CLINIC | Age: 51
End: 2021-08-02

## 2021-08-02 VITALS
HEIGHT: 64 IN | WEIGHT: 178 LBS | SYSTOLIC BLOOD PRESSURE: 124 MMHG | OXYGEN SATURATION: 99 % | DIASTOLIC BLOOD PRESSURE: 64 MMHG | BODY MASS INDEX: 30.39 KG/M2 | TEMPERATURE: 96.7 F | RESPIRATION RATE: 18 BRPM | HEART RATE: 61 BPM

## 2021-08-02 DIAGNOSIS — Z13.21 MALNUTRITION SCREEN: ICD-10-CM

## 2021-08-02 DIAGNOSIS — R53.83 FATIGUE, UNSPECIFIED TYPE: Primary | ICD-10-CM

## 2021-08-02 DIAGNOSIS — Z98.84 STATUS POST BARIATRIC SURGERY: ICD-10-CM

## 2021-08-02 DIAGNOSIS — R10.31 RIGHT LOWER QUADRANT ABDOMINAL PAIN: ICD-10-CM

## 2021-08-02 DIAGNOSIS — Z13.0 SCREENING, IRON DEFICIENCY ANEMIA: ICD-10-CM

## 2021-08-02 DIAGNOSIS — K90.9 INTESTINAL MALABSORPTION, UNSPECIFIED TYPE: ICD-10-CM

## 2021-08-02 DIAGNOSIS — E55.9 HYPOVITAMINOSIS D: ICD-10-CM

## 2021-08-02 DIAGNOSIS — K91.2 POSTGASTRECTOMY MALABSORPTION: ICD-10-CM

## 2021-08-02 DIAGNOSIS — Z90.3 POSTGASTRECTOMY MALABSORPTION: ICD-10-CM

## 2021-08-02 PROCEDURE — 99214 OFFICE O/P EST MOD 30 MIN: CPT | Performed by: PHYSICIAN ASSISTANT

## 2021-08-02 RX ORDER — ESTRADIOL 0.05 MG/D
1 PATCH, EXTENDED RELEASE TRANSDERMAL WEEKLY
COMMUNITY
Start: 2021-07-26

## 2021-08-02 RX ORDER — DEXLANSOPRAZOLE 60 MG/1
1 CAPSULE, DELAYED RELEASE ORAL DAILY
Qty: 90 CAPSULE | Refills: 3 | Status: SHIPPED | OUTPATIENT
Start: 2021-08-02 | End: 2022-05-24

## 2021-08-02 RX ORDER — PROGESTERONE 200 MG/1
200 CAPSULE ORAL DAILY
COMMUNITY
Start: 2021-07-24

## 2021-08-02 RX ORDER — LANOLIN ALCOHOL/MO/W.PET/CERES
400 CREAM (GRAM) TOPICAL DAILY
COMMUNITY
End: 2022-12-09

## 2021-08-02 NOTE — PROGRESS NOTES
"[facility] B[facility]ric Surgery  [address]  [address]  [facility] [id number] [phone]        Patient Name:  Conchita Acevedo.  :  1970        Reason for Visit:   20 months postop      HPI: Conchita Acevedo is a 50 y.o. female  s/p lap GJ w/ recurrent HHR 19 GDW (hx LSG )     OV 2020 with c/o nausea, uncontrolled reflux, loose stools.  Had UGI 3/9/20- looked fine. EGD planned but not completed due to COVID.  She did not complete stool studies as ordered.     LOV 2020- improved, rare nausea. Mult BM a day, a lot of flatulence.     Presents for eval  Of RLQ abd pain ongoing since May. Went to [facility] with concern for appendicitis, had CT abd/ pelvis wo contrast 21 [address] [place] read as abdunant fecal matter in the colon creating distension of the rectosigmoid.  She is concerned something was missed on the CT report and would like Dr. Jacobs to take a look at the images (she brought the disc) given that pain persists and he is familiar with her anatomy. Abd pain is episodic. Noted several times a week at least. May last a couple days to a week or more at a time. Pain is constant during these episodes described as  \"discomfort, not feeling right\". Not sharp. No association with PO intake.  No radiation.  Has BM 1-3 times a day, normal to loose in consistency, doesn't feel like she ever deals with constipation.  Taking probiotic regularly, hasn't tried other bowel regimens.  Reflux controlled on dexilant. Satisfied with weight loss.  She saw gyn with no noted pelvic etiology. She has pending cologard test, has not had colonoscopy in the past.  No upper GI symptoms. Denies dysphagia, reflux, nausea, vomiting, pulmonary issues and fevers.  Getting 90+g prot/day.  Drinking 64+ fluid oz/day.    Taking MVI and folic acid, chromium, iron, vit C, Vit D.  On dexilant, needs refill of 90 day supply to [facility] mail order.   Exercising- walking.         Presurgery weight: " 222 pounds.  Today's weight is 80.7 kg (178 lb) pounds, today's  Body mass index is 31.04 kg/m²., and@ weight loss since surgery is 44 pounds.      Past Medical History:   Diagnosis Date   • Anxiety    • Diabetes mellitus (CMS/HCC)    • Dyspepsia    • Fatigue    • GERD (gastroesophageal reflux disease)     on Dexilant   • Hypothyroidism    • MRSA infection 2015    incision from back surgery BELOW belly button per patient   • PCOS (polycystic ovarian syndrome)      Past Surgical History:   Procedure Laterality Date   • ANTERIOR LUMBAR FUSION W/ FRA  2015   • BLADDER SLING MODIFIED, ANTERIOR AND POSTERIOR VAGINAL REPAIR  2016   • ENDOSCOPY  12/19/2016     Dr. Jacobs, some mild diffuse gastritis,   • ENDOSCOPY  08/03/2016    Dr. Jacobs, mild gastrits and large surture at GE junction with surrounding foreign body reaction, suture was removed, no obvious HH, sleeve was fairly unremarkable. Distal esphageal bx revealsed ulcerative esophagitis with candida and gram + cocci.    • ENDOSCOPY N/A 12/19/2019    Procedure: ESOPHAGOGASTRODUODENOSCOPY;  Surgeon: Narendra Jacobs MD;  Location:  [facility];  Service: General   • GASTRIC SLEEVE LAPAROSCOPIC  07/08/2010    w. Dr. Jacobs   • GASTROJEJUNOSTOMY N/A 12/19/2019    Procedure: GASTROJEJUNOSTOMY LAPAROSCOPIC;  Surgeon: Narendra Jacobs MD;  Location:  [facility];  Service: General   • HIATAL HERNIA REPAIR  07/2015    Dr. Jacobs, [facility] partial gastectomy for outpouching of cardia   • HIATAL HERNIA REPAIR N/A 12/19/2019    Procedure: HIATAL HERNIA REPAIR LAPAROSCOPIC;  Surgeon: Narendra Jacobs MD;  Location:  [facility];  Service: General   • HYSTERECTOMY  2018    vaginal, partial   • LAPAROSCOPIC CHOLECYSTECTOMY  04/13/2011    Dr. Jacobs     Outpatient Medications Marked as Taking for the 8/2/21 encounter (Office Visit) with Dori Patel PA-C   Medication Sig Dispense Refill   • Ascorbic Acid (Vitamin C) 500 MG chewable tablet Chew 500 mg Daily.     • Chromium 200 MCG  "capsule Take 200 mcg by mouth Daily.     • Dexilant 60 MG capsule TAKE 1 CAPSULE BY MOUTH  DAILY 90 capsule 3   • Lisa 0.05 MG/24HR patch Place 1 patch on the skin as directed by provider 1 (One) Time Per Week.     • EC-RX Testosterone 0.2 % cream      • escitalopram (LEXAPRO) 20 MG tablet Take 20 mg by mouth Daily.     • Ferrous Sulfate Dried (High Potency Iron) 65 MG tablet Take 65 mg by mouth Daily.     • folic acid (FOLVITE) 400 MCG tablet Take 400 mcg by mouth Daily.     • metFORMIN (GLUCOPHAGE) 1000 MG tablet Take 1,000 mg by mouth 2 (Two) Times a Day With Meals.     • Multiple Vitamin (MULTI VITAMIN PO) Take 1 tablet by mouth Daily.     • Progesterone (PROMETRIUM) 200 MG capsule Take 200 mg by mouth Daily.     • Thyroid 60 MG PO tablet Take 45 mg by mouth Daily.         Allergies   Allergen Reactions   • Fish-Derived Products Anaphylaxis   • Peanut-Containing Drug Products Anaphylaxis   • Amoxil [Amoxicillin] GI Intolerance   • Lovenox [Enoxaparin] Rash   • Sulfa Antibiotics Rash       Social History     Socioeconomic History   • Marital status:      Spouse name: Not on file   • Number of children: Not on file   • Years of education: Not on file   • Highest education level: Not on file   Tobacco Use   • Smoking status: Never Smoker   • Smokeless tobacco: Never Used   Substance and Sexual Activity   • Alcohol use: No   • Drug use: No   • Sexual activity: Defer       /64 (BP Location: Left arm, Patient Position: Sitting, Cuff Size: Large Adult)   Pulse 61   Temp 96.7 °F (35.9 °C) (Temporal)   Resp 18   Ht 161.3 cm (63.5\")   Wt 80.7 kg (178 lb)   SpO2 99%   BMI 31.04 kg/m²     Physical Exam  Constitutional:       Appearance: She is well-developed. She is obese.   HENT:      Head: Normocephalic and atraumatic.   Cardiovascular:      Rate and Rhythm: Normal rate and regular rhythm.   Pulmonary:      Effort: Pulmonary effort is normal.      Breath sounds: Normal breath sounds.   Abdominal:      " General: Bowel sounds are normal.      Palpations: Abdomen is soft.      Comments: Incisions well healed  Pain of RLQ unable to be reproduced, not located at past incision site, no abnormalities noted.    Skin:     General: Skin is warm and dry.   Neurological:      Mental Status: She is alert.   Psychiatric:         Mood and Affect: Mood normal.         Behavior: Behavior normal.         Thought Content: Thought content normal.         Judgment: Judgment normal.           Assessment:  20 months s/p lap GJ w/ recurrent HHR 12/19/19 GDW (hx LSG 2010)      ICD-10-CM ICD-9-CM   1. Fatigue, unspecified type  R53.83 780.79   2. Status post bariatric surgery  Z98.84 V45.86   3. Right lower quadrant abdominal pain  R10.31 789.03   4. Hypovitaminosis D  E55.9 268.9   5. Malnutrition screen  Z13.21 V77.2   6. Screening, iron deficiency anemia  Z13.0 V78.0   7. Intestinal malabsorption, unspecified type  K90.9 579.9   8. Postgastrectomy malabsorption  K91.2 579.3    Z90.3          Plan: will share CD images with Dr. Jacobs for review from bariatric perspective.  cont PPI. Labs today.  Continue w/ good food choices and healthy habits.  Continue to focus on high protein, low carb.  Keep tracking intake.  Continue routine exercise.  Routine bariatric labs ordered.  Continue vitamins w/ adjustments pending lab results.  Call w/ problems/concerns.     Patient's Body mass index is 31.04 kg/m². indicating that she is obese (BMI >30). Obesity-related health conditions include the following: as above. Obesity is improving with treatment. BMI is is above average; BMI management plan is completed. We discussed low calorie, low carb based diet program, portion control and increasing exercise..        The patient was instructed to follow up in 6 months, sooner if needed.

## 2021-08-04 ENCOUNTER — TELEPHONE (OUTPATIENT)
Dept: BARIATRICS/WEIGHT MGMT | Facility: CLINIC | Age: 51
End: 2021-08-04

## 2021-08-04 DIAGNOSIS — K59.00 CONSTIPATION, UNSPECIFIED CONSTIPATION TYPE: ICD-10-CM

## 2021-08-04 DIAGNOSIS — R10.9 ABDOMINAL PAIN, UNSPECIFIED ABDOMINAL LOCATION: Primary | ICD-10-CM

## 2021-08-04 LAB
25(OH)D3+25(OH)D2 SERPL-MCNC: 68.5 NG/ML (ref 30–100)
A-TOCOPHEROL VIT E SERPL-MCNC: 12.5 MG/L (ref 7–25.1)
ALBUMIN SERPL-MCNC: 4.3 G/DL (ref 3.5–5.2)
ALBUMIN/GLOB SERPL: 1.9 G/DL
ALP SERPL-CCNC: 120 U/L (ref 39–117)
ALT SERPL-CCNC: 22 U/L (ref 1–33)
AST SERPL-CCNC: 19 U/L (ref 1–32)
BASOPHILS # BLD AUTO: 0.08 10*3/MM3 (ref 0–0.2)
BASOPHILS NFR BLD AUTO: 0.9 % (ref 0–1.5)
BILIRUB SERPL-MCNC: 0.4 MG/DL (ref 0–1.2)
BUN SERPL-MCNC: 8 MG/DL (ref 6–20)
BUN/CREAT SERPL: 12.1 (ref 7–25)
CALCIUM SERPL-MCNC: 9.3 MG/DL (ref 8.6–10.5)
CHLORIDE SERPL-SCNC: 102 MMOL/L (ref 98–107)
CO2 SERPL-SCNC: 27.8 MMOL/L (ref 22–29)
CREAT SERPL-MCNC: 0.66 MG/DL (ref 0.57–1)
EOSINOPHIL # BLD AUTO: 0.16 10*3/MM3 (ref 0–0.4)
EOSINOPHIL NFR BLD AUTO: 1.8 % (ref 0.3–6.2)
ERYTHROCYTE [DISTWIDTH] IN BLOOD BY AUTOMATED COUNT: 12.6 % (ref 12.3–15.4)
FERRITIN SERPL-MCNC: 13.6 NG/ML (ref 13–150)
FOLATE SERPL-MCNC: >20 NG/ML (ref 4.78–24.2)
GAMMA TOCOPHEROL SERPL-MCNC: 0.6 MG/L (ref 0.5–5.5)
GLOBULIN SER CALC-MCNC: 2.3 GM/DL
GLUCOSE SERPL-MCNC: 107 MG/DL (ref 65–99)
HCT VFR BLD AUTO: 40.4 % (ref 34–46.6)
HGB BLD-MCNC: 13.4 G/DL (ref 12–15.9)
IMM GRANULOCYTES # BLD AUTO: 0.02 10*3/MM3 (ref 0–0.05)
IMM GRANULOCYTES NFR BLD AUTO: 0.2 % (ref 0–0.5)
IRON SERPL-MCNC: 77 MCG/DL (ref 37–145)
LYMPHOCYTES # BLD AUTO: 2.85 10*3/MM3 (ref 0.7–3.1)
LYMPHOCYTES NFR BLD AUTO: 32 % (ref 19.6–45.3)
Lab: NORMAL
MAGNESIUM SERPL-MCNC: 2.2 MG/DL (ref 1.6–2.6)
MCH RBC QN AUTO: 30 PG (ref 26.6–33)
MCHC RBC AUTO-ENTMCNC: 33.2 G/DL (ref 31.5–35.7)
MCV RBC AUTO: 90.4 FL (ref 79–97)
METHYLMALONATE SERPL-SCNC: 161 NMOL/L (ref 0–378)
MONOCYTES # BLD AUTO: 0.47 10*3/MM3 (ref 0.1–0.9)
MONOCYTES NFR BLD AUTO: 5.3 % (ref 5–12)
NEUTROPHILS # BLD AUTO: 5.34 10*3/MM3 (ref 1.7–7)
NEUTROPHILS NFR BLD AUTO: 59.8 % (ref 42.7–76)
NRBC BLD AUTO-RTO: 0 /100 WBC (ref 0–0.2)
PHOSPHATE SERPL-MCNC: 3.7 MG/DL (ref 2.5–4.5)
PLATELET # BLD AUTO: 356 10*3/MM3 (ref 140–450)
POTASSIUM SERPL-SCNC: 4.9 MMOL/L (ref 3.5–5.2)
PREALB SERPL-MCNC: 23 MG/DL (ref 12–34)
PROT SERPL-MCNC: 6.6 G/DL (ref 6–8.5)
PTH-INTACT SERPL-MCNC: 35 PG/ML (ref 15–65)
RBC # BLD AUTO: 4.47 10*6/MM3 (ref 3.77–5.28)
SODIUM SERPL-SCNC: 141 MMOL/L (ref 136–145)
VIT A SERPL-MCNC: 53.7 UG/DL (ref 20.1–62)
VIT B1 BLD-SCNC: 169 NMOL/L (ref 66.5–200)
WBC # BLD AUTO: 8.92 10*3/MM3 (ref 3.4–10.8)
ZINC SERPL-MCNC: 107 UG/DL (ref 44–115)

## 2021-08-04 NOTE — TELEPHONE ENCOUNTER
Notified pt that Dr. Jacobs reviewed CT images.  I told her that he recommends GI referral for further eval with noted large amounts of stool in colon, o/w looked okay from his perspective. Pt wants to see [facility] please place referral, and I told her that they will contact her for an appt date and time.

## 2021-08-04 NOTE — TELEPHONE ENCOUNTER
Reviewed her CT images - agree massive amount of stool in rectosigmoid extending into lower abdomen including RLQ.  Rec GI eval, thanks!

## 2021-09-29 ENCOUNTER — HOSPITAL ENCOUNTER (OUTPATIENT)
Dept: GENERAL RADIOLOGY | Facility: HOSPITAL | Age: 51
Discharge: HOME | End: 2021-09-29
Admitting: NURSE PRACTITIONER

## 2021-09-29 ENCOUNTER — OFFICE VISIT (OUTPATIENT)
Dept: GASTROENTEROLOGY | Facility: CLINIC | Age: 51
End: 2021-09-29

## 2021-09-29 DIAGNOSIS — R19.4 CHANGE IN BOWEL HABITS: ICD-10-CM

## 2021-09-29 DIAGNOSIS — R10.31 ABDOMINAL PAIN, RLQ: Primary | ICD-10-CM

## 2021-09-29 DIAGNOSIS — R19.7 DIARRHEA, UNSPECIFIED TYPE: ICD-10-CM

## 2021-09-29 DIAGNOSIS — R10.31 ABDOMINAL PAIN, RLQ: ICD-10-CM

## 2021-09-29 PROCEDURE — 74018 RADEX ABDOMEN 1 VIEW: CPT

## 2021-09-29 PROCEDURE — 74018 RADEX ABDOMEN 1 VIEW: CPT | Performed by: RADIOLOGY

## 2021-09-29 PROCEDURE — 99204 OFFICE O/P NEW MOD 45 MIN: CPT | Performed by: NURSE PRACTITIONER

## 2021-09-29 NOTE — PROGRESS NOTES
New Patient Consultation     Patient Name: Conchita Acevedo  : 1970   MRN: [id number]   You have chosen to receive care through a telephone visit. Do you consent to use a telephone visit for your medical care today? Yes    Chief Complaint:    Chief Complaint   Patient presents with   • Abdominal Pain       History of Present Illness: Conchita Acevedo is a 51 y.o. female who is here today for a Gastroenterology Consultation for abdominal pain.    The pain is intermittent and is in the RLQ for the past several months. The pain can occur weeks apart and may last days.  It is more of a pressure than a pain.  It is not very bothersome.  She went to the  for this and had CT scan which showed a large stool burden.  She is very confused by this as she has multiple liquid stools a day.  She did just have a cologuard test that was negative.    Did get evaluated by GYN and no GYN cause was found for RLQ pain.    Her abdominal surgical history includes cholecystectomy, bladder sling, bariatric surgery, hiatal hernia repair, and hysterectomy  She is not on a blood thinner.    She has no family history of colon cancer  Subjective      Review of Systems:   Review of Systems   Constitutional: Negative for appetite change and unexpected weight loss.   HENT: Negative for trouble swallowing.    Gastrointestinal: Positive for abdominal pain and diarrhea. Negative for abdominal distention, anal bleeding, blood in stool, constipation and vomiting.       Past Medical History:   Past Medical History:   Diagnosis Date   • Anxiety    • Diabetes mellitus (CMS/HCC)    • Dyspepsia    • Fatigue    • GERD (gastroesophageal reflux disease)     on Dexilant   • Hypothyroidism    • MRSA infection     incision from back surgery BELOW belly button per patient   • PCOS (polycystic ovarian syndrome)        Past Surgical History:   Past Surgical History:   Procedure Laterality Date   • ANTERIOR LUMBAR FUSION W/ FRA     •  BLADDER SLING MODIFIED, ANTERIOR AND POSTERIOR VAGINAL REPAIR  2016   • ENDOSCOPY  12/19/2016     Dr. Jacobs, some mild diffuse gastritis,   • ENDOSCOPY  08/03/2016    Dr. Jacobs, mild gastrits and large surture at GE junction with surrounding foreign body reaction, suture was removed, no obvious HH, sleeve was fairly unremarkable. Distal esphageal bx revealsed ulcerative esophagitis with candida and gram + cocci.    • ENDOSCOPY N/A 12/19/2019    Procedure: ESOPHAGOGASTRODUODENOSCOPY;  Surgeon: Narendra Jacobs MD;  Location:  [facility];  Service: General   • GASTRIC SLEEVE LAPAROSCOPIC  07/08/2010    w. Dr. Jacobs   • GASTROJEJUNOSTOMY N/A 12/19/2019    Procedure: GASTROJEJUNOSTOMY LAPAROSCOPIC;  Surgeon: Narendra Jacobs MD;  Location:  [facility];  Service: General   • HIATAL HERNIA REPAIR  07/2015    Dr. Jacobs, [facility] partial gastectomy for outpouching of cardia   • HIATAL HERNIA REPAIR N/A 12/19/2019    Procedure: HIATAL HERNIA REPAIR LAPAROSCOPIC;  Surgeon: Narendra Jacobs MD;  Location:  [facility];  Service: General   • HYSTERECTOMY  2018    vaginal, partial   • LAPAROSCOPIC CHOLECYSTECTOMY  04/13/2011    Dr. Jacobs       Family History:   Family History   Problem Relation Age of Onset   • Diabetes Mother    • Heart disease Mother    • Kidney failure Mother    • Heart disease Father    • Stroke Father    • Diabetes Father    • Heart attack Father    • Heart disease Maternal Grandmother    • Diabetes Maternal Grandmother    • Stroke Maternal Grandmother    • Heart disease Paternal Grandmother    • Diabetes Paternal Grandmother    • Stroke Paternal Grandmother        Social History:   Social History     Socioeconomic History   • Marital status:      Spouse name: Not on file   • Number of children: Not on file   • Years of education: Not on file   • Highest education level: Not on file   Tobacco Use   • Smoking status: Never Smoker   • Smokeless tobacco: Never Used   Substance and Sexual Activity   •  Alcohol use: No   • Drug use: No   • Sexual activity: Defer       Alcohol/Tobacco History:   Social History     Substance and Sexual Activity   Alcohol Use No     Social History     Tobacco Use   Smoking Status Never Smoker   Smokeless Tobacco Never Used       Medications:     Current Outpatient Medications:   •  Ascorbic Acid (Vitamin C) 500 MG chewable tablet, Chew 500 mg Daily., Disp: , Rfl:   •  Chromium 200 MCG capsule, Take 200 mcg by mouth Daily., Disp: , Rfl:   •  dexlansoprazole (Dexilant) 60 MG capsule, Take 1 capsule by mouth Daily., Disp: 90 capsule, Rfl: 3  •  Lisa 0.05 MG/24HR patch, Place 1 patch on the skin as directed by provider 1 (One) Time Per Week., Disp: , Rfl:   •  EC-RX Testosterone 0.2 % cream, , Disp: , Rfl:   •  escitalopram (LEXAPRO) 20 MG tablet, Take 20 mg by mouth Daily., Disp: , Rfl:   •  Ferrous Sulfate Dried (High Potency Iron) 65 MG tablet, Take 65 mg by mouth Daily., Disp: , Rfl:   •  folic acid (FOLVITE) 400 MCG tablet, Take 400 mcg by mouth Daily., Disp: , Rfl:   •  metFORMIN (GLUCOPHAGE) 1000 MG tablet, Take 1,000 mg by mouth 2 (Two) Times a Day With Meals., Disp: , Rfl:   •  Multiple Vitamin (MULTI VITAMIN PO), Take 1 tablet by mouth Daily., Disp: , Rfl:   •  Progesterone (PROMETRIUM) 200 MG capsule, Take 200 mg by mouth Daily., Disp: , Rfl:   •  Thyroid 60 MG PO tablet, Take 45 mg by mouth Daily., Disp: , Rfl:     Allergies:   Allergies   Allergen Reactions   • Fish-Derived Products Anaphylaxis   • Peanut-Containing Drug Products Anaphylaxis   • Amoxil [Amoxicillin] GI Intolerance   • Lovenox [Enoxaparin] Rash   • Sulfa Antibiotics Rash       Objective     Physical Exam:  Vital Signs: There were no vitals filed for this visit.  There is no height or weight on file to calculate BMI.     Physical Exam  Neurological:      Mental Status: She is oriented to person, place, and time.   Psychiatric:         Mood and Affect: Mood normal.         Thought Content: Thought content  normal.         Assessment / Plan      Assessment/Plan:   Diagnoses and all orders for this visit:    1. Abdominal pain, RLQ (Primary)  -     XR Abdomen KUB; Future  -     Ambulatory Referral For Screening Colonoscopy    2. Change in bowel habits  -     XR Abdomen KUB; Future  -     Ambulatory Referral For Screening Colonoscopy    3. Diarrhea, unspecified type    I believe she may be having overflow diarrhea secondary to constipation.  Will obtain a KUB to assess current stool burden.  She can continue taking a probiotic.  Recommend colonoscopy for right lower quadrant pain (which may be from constipation).  If KUB shows large stool burden, will start medication for constipation and would recommend pelvic floor rehab due to hx numerous abdominal surgeries as she may have some pelvic floor dyssynergia      Follow Up:   Return in about 8 weeks (around 11/24/2021).    Plan of care reviewed with the patient at the conclusion of today's visit.  Education was provided regarding diagnosis, management, and any prescribed or recommended OTC medications.  Patient verbalized understanding of and agreement with management plan.     Time Statement:   Discussed plan of care in detail with patient today. Patient verbally understands and agrees. I have spent 30 minutes reviewing available diagnostics, obtaining history, examining the patient, developing a treatment plan, and educating the patient on disease process and plan of care.    [facility]  [facility]

## 2021-09-30 ENCOUNTER — TELEPHONE (OUTPATIENT)
Dept: GASTROENTEROLOGY | Facility: CLINIC | Age: 51
End: 2021-09-30

## 2021-09-30 NOTE — TELEPHONE ENCOUNTER
To speak with patient regarding her KUB showing large stool burden.  We discussed that her diarrhea is likely overflow diarrhea secondary to constipation.  She will start MiraLAX once daily.  Right lower quadrant discomfort completely resolves, she may call and cancel the colonoscopy.  If it remains, will move forward with colonoscopy.  Recommend increasing dietary fiber as able within post bariatric surgery diet.  Counseled on adequate hydration.  We will see her back as needed.

## 2021-10-15 DIAGNOSIS — Z12.11 SCREENING FOR COLON CANCER: Primary | ICD-10-CM

## 2021-10-15 RX ORDER — SODIUM, POTASSIUM,MAG SULFATES 17.5-3.13G
1 SOLUTION, RECONSTITUTED, ORAL ORAL TAKE AS DIRECTED
Qty: 354 ML | Refills: 0 | Status: SHIPPED | OUTPATIENT
Start: 2021-10-15 | End: 2021-11-16 | Stop reason: SDUPTHER

## 2021-10-19 ENCOUNTER — TELEPHONE (OUTPATIENT)
Dept: GASTROENTEROLOGY | Facility: CLINIC | Age: 51
End: 2021-10-19

## 2021-11-16 ENCOUNTER — TELEPHONE (OUTPATIENT)
Dept: GASTROENTEROLOGY | Facility: CLINIC | Age: 51
End: 2021-11-16

## 2021-11-16 DIAGNOSIS — Z12.11 SCREENING FOR COLON CANCER: ICD-10-CM

## 2021-11-16 RX ORDER — SODIUM, POTASSIUM,MAG SULFATES 17.5-3.13G
1 SOLUTION, RECONSTITUTED, ORAL ORAL TAKE AS DIRECTED
Qty: 354 ML | Refills: 0 | Status: SHIPPED | OUTPATIENT
Start: 2021-11-16 | End: 2022-12-09

## 2021-11-16 NOTE — TELEPHONE ENCOUNTER
Patient had taken half of her Suprep for the colonoscopy scheduled last week, but her  canceled on her.  She will need a new prescription sent in to her pharmacy.  Thank you!

## 2021-11-23 ENCOUNTER — OUTSIDE FACILITY SERVICE (OUTPATIENT)
Dept: GASTROENTEROLOGY | Facility: CLINIC | Age: 51
End: 2021-11-23

## 2021-11-23 PROCEDURE — 88305 TISSUE EXAM BY PATHOLOGIST: CPT | Performed by: INTERNAL MEDICINE

## 2021-11-23 PROCEDURE — 45380 COLONOSCOPY AND BIOPSY: CPT | Performed by: INTERNAL MEDICINE

## 2021-11-24 ENCOUNTER — LAB REQUISITION (OUTPATIENT)
Dept: LAB | Facility: HOSPITAL | Age: 51
End: 2021-11-24

## 2021-11-24 DIAGNOSIS — R19.7 DIARRHEA, UNSPECIFIED: ICD-10-CM

## 2021-11-24 DIAGNOSIS — K64.8 OTHER HEMORRHOIDS: ICD-10-CM

## 2021-11-24 DIAGNOSIS — R10.31 RIGHT LOWER QUADRANT PAIN: ICD-10-CM

## 2021-11-29 DIAGNOSIS — R10.31 ABDOMINAL PAIN, RLQ: Primary | ICD-10-CM

## 2022-05-24 RX ORDER — DEXLANSOPRAZOLE 60 MG/1
1 CAPSULE, DELAYED RELEASE ORAL DAILY
Qty: 90 CAPSULE | Refills: 3 | Status: SHIPPED | OUTPATIENT
Start: 2022-05-24

## 2022-12-09 ENCOUNTER — OFFICE VISIT (OUTPATIENT)
Dept: CARDIOLOGY | Facility: CLINIC | Age: 52
End: 2022-12-09

## 2022-12-09 VITALS
DIASTOLIC BLOOD PRESSURE: 66 MMHG | HEIGHT: 63 IN | BODY MASS INDEX: 24.8 KG/M2 | WEIGHT: 140 LBS | SYSTOLIC BLOOD PRESSURE: 118 MMHG | OXYGEN SATURATION: 99 % | HEART RATE: 75 BPM

## 2022-12-09 DIAGNOSIS — I25.10 CORONARY ARTERY DISEASE INVOLVING NATIVE CORONARY ARTERY OF NATIVE HEART WITHOUT ANGINA PECTORIS: ICD-10-CM

## 2022-12-09 DIAGNOSIS — R00.2 PALPITATIONS: Primary | ICD-10-CM

## 2022-12-09 PROCEDURE — 93000 ELECTROCARDIOGRAM COMPLETE: CPT | Performed by: PHYSICIAN ASSISTANT

## 2022-12-09 PROCEDURE — 99204 OFFICE O/P NEW MOD 45 MIN: CPT | Performed by: PHYSICIAN ASSISTANT

## 2022-12-09 RX ORDER — SEMAGLUTIDE 1.34 MG/ML
INJECTION, SOLUTION SUBCUTANEOUS AS NEEDED
COMMUNITY

## 2022-12-09 NOTE — PROGRESS NOTES
"[facility] at [facility]  INITIAL OFFICE CONSULT      Conchita Acevedo  1970  PCP: Becca Beckham    SUBJECTIVE:   Conchita Acevedo is a 52 y.o. female seen for a consultation visit regarding the following:     Chief Complaint:   Chief Complaint   Patient presents with   • Chest Pain   • Palpitations          Consultation is requested by [facility] for evaluation of Chest Pain and Palpitations        History:  52-year-old female presents today after a 3-year hiatus from last being seen by Dr. Shan Ross for chest pain and palpitations in 2019.  During this visit with Dr. Ross she underwent a stress test which was normal.  She also had a ZIO monitor that revealed no arrhythmias.  She states that she continues to worry constantly about her risk for coronary disease as she has terrible family history of sudden death and myocardial infarction's with both her parents and sisters and brothers.  She has been working hard to keep her risk factors down such as keeping her blood pressure control.  She states she is also doing well to keep her diabetes and check.  She does not exercise very frequently.  She has some atypical type chest pain in the past.  Does not seem to be very consistent all the time with exertion.  She denies recent recurrent palpitations dizziness near syncope or syncope.  She denies heart failure symptoms.  She states she recently had lab work and was told that her cholesterol was \"good\"      Cardiac PMH: (Old records have been reviewed and summarized below)  1. Palpitations:  a. Remote stress test, echocardiogram, and CT cardiac calcium score at [facility] approximately 2007; negative per patient-data deficit  b. Remote stress test and echocardiogram approximately 2010; negative per patient-data deficit, performed for cardiac clearance before her gastric sleeve  c. Remote stress test approximately 2014 at [facility]; " "negative per patient-data deficit  d. Residual class I symptoms with acceptable EKG, July 2019  e. Stress echocardiogram August 22, 2019.  Patient denied chest pain during stress test.  Echocardiogram revealed normal LV function no pulmonary hypertension and no structural valvular abnormalities.  f. ZIO monitor 7/26/2019 patient was monitored for 13 days.  Average heart rate 81 bpm.  Minimum heart rate 48 bpm.  Maximum heart rate 194.  The monitor was a normal study.  No arrhythmias.  2. GERD  3. Anxiety/depression  4. PCOS  5. Type 2 diabetes mellitus; hemoglobin A1c 7.7%  6. Thyroid dysfunction  7. Mild obesity: BMI 31.1  8. Chronic low ferritin levels  9. History of mitral valve prolapse  10. Family history of early CAD: Mother with MI at 45, followed by CABG x4 vessels, multiple paternal relatives with \"widowmakers\" in their 50s  11. Surgical history:  a. Gastric sleeve, July 2010  b. Lap cholecystectomy, April 2011  c. Hiatal hernia repair with partial gastrectomy, July 2015  d. Back surgery  e. ANTWON  f. Bladder sling    Past Medical History, Past Surgical History, Family history, Social History, and Medications were all reviewed with the patient today and updated as necessary.     Current Outpatient Medications   Medication Sig Dispense Refill   • Dexilant 60 MG capsule TAKE 1 CAPSULE BY MOUTH  DAILY 90 capsule 3   • Lisa 0.05 MG/24HR patch Place 1 patch on the skin as directed by provider 1 (One) Time Per Week.     • EC-RX Testosterone 0.2 % cream      • escitalopram (LEXAPRO) 20 MG tablet Take 20 mg by mouth Daily.     • metFORMIN (GLUCOPHAGE) 1000 MG tablet Take 1,000 mg by mouth Daily.     • Multiple Vitamin (MULTI VITAMIN PO) Take 1 tablet by mouth Daily.     • Progesterone (PROMETRIUM) 200 MG capsule Take 200 mg by mouth Daily.     • Semaglutide,0.25 or 0.5MG/DOS, (Ozempic, 0.25 or 0.5 MG/DOSE,) 2 MG/1.5ML solution pen-injector As Needed. Once a week     • Thyroid 60 MG PO tablet Take 45 mg by mouth " Daily.       No current facility-administered medications for this visit.     Allergies   Allergen Reactions   • Fish-Derived Products Anaphylaxis   • Peanut-Containing Drug Products Anaphylaxis   • Amoxil [Amoxicillin] GI Intolerance   • Lovenox [Enoxaparin] Rash   • Sulfa Antibiotics Rash         Past Medical History:   Diagnosis Date   • Anxiety    • Diabetes mellitus (HCC)    • Dyspepsia    • Fatigue    • GERD (gastroesophageal reflux disease)     on Dexilant   • Hypothyroidism    • MRSA infection 2015    incision from back surgery BELOW belly button per patient   • PCOS (polycystic ovarian syndrome)      Past Surgical History:   Procedure Laterality Date   • ANTERIOR LUMBAR FUSION W/ FRA  2015   • BLADDER SLING MODIFIED, ANTERIOR AND POSTERIOR VAGINAL REPAIR  2016   • ENDOSCOPY  12/19/2016     Dr. Jacobs, some mild diffuse gastritis,   • ENDOSCOPY  08/03/2016    Dr. Jacobs, mild gastrits and large surture at GE junction with surrounding foreign body reaction, suture was removed, no obvious HH, sleeve was fairly unremarkable. Distal esphageal bx revealsed ulcerative esophagitis with candida and gram + cocci.    • ENDOSCOPY N/A 12/19/2019    Procedure: ESOPHAGOGASTRODUODENOSCOPY;  Surgeon: Narendra Jacobs MD;  Location:  [facility];  Service: General   • GASTRIC SLEEVE LAPAROSCOPIC  07/08/2010    wMarquez Jacobs   • GASTROJEJUNOSTOMY N/A 12/19/2019    Procedure: GASTROJEJUNOSTOMY LAPAROSCOPIC;  Surgeon: Narendra Jacobs MD;  Location:  [facility];  Service: General   • HIATAL HERNIA REPAIR  07/2015    Dr. Jacobs, [facility] partial gastectomy for outpouching of cardia   • HIATAL HERNIA REPAIR N/A 12/19/2019    Procedure: HIATAL HERNIA REPAIR LAPAROSCOPIC;  Surgeon: Narendra Jacobs MD;  Location:  [facility];  Service: General   • HYSTERECTOMY  2018    vaginal, partial   • LAPAROSCOPIC CHOLECYSTECTOMY  04/13/2011    Dr. Jacobs     Family History   Problem Relation Age of Onset   • Diabetes Mother    • Heart disease  "Mother    • Kidney failure Mother    • Heart attack Mother    • Heart failure Mother    • Hypertension Mother    • Heart disease Father    • Stroke Father    • Diabetes Father    • Heart attack Father    • Heart failure Father    • Hypertension Father    • Heart disease Maternal Grandmother    • Diabetes Maternal Grandmother    • Stroke Maternal Grandmother    • Heart disease Paternal Grandmother    • Diabetes Paternal Grandmother    • Stroke Paternal Grandmother    • Heart attack Paternal Grandmother      Social History     Tobacco Use   • Smoking status: Never   • Smokeless tobacco: Never   Substance Use Topics   • Alcohol use: No       ROS:  Review of Symptoms:  General: no recent weight loss/gain, weakness or fatigue  Skin: no rashes, lumps, or other skin changes  HEENT: no dizziness, lightheadedness, or vision changes  Respiratory: no cough or hemoptysis  Cardiovascular: + palpitations, and tachycardia  Gastrointestinal: no black/tarry stools or diarrhea  Urinary: no change in frequency or urgency  Peripheral Vascular: no claudication or leg cramps  Musculoskeletal: no muscle or joint pain/stiffness  Psychiatric: no depression or excessive stress  Neurological: no sensory or motor loss, no syncope  Hematologic: no anemia, easy bruising or bleeding  Endocrine: no thyroid problems, nor heat or cold intolerance         PHYSICAL EXAM:   /66 (BP Location: Right arm, Patient Position: Sitting, Cuff Size: Adult)   Pulse 75   Ht 160 cm (63\")   Wt 63.5 kg (140 lb)   SpO2 99%   BMI 24.80 kg/m²      Wt Readings from Last 5 Encounters:   12/09/22 63.5 kg (140 lb)   08/02/21 80.7 kg (178 lb)   08/31/20 75.3 kg (166 lb)   02/18/20 74.2 kg (163 lb 8 oz)   01/17/20 75.3 kg (166 lb)     BP Readings from Last 5 Encounters:   12/09/22 118/66   08/02/21 124/64   02/18/20 112/70   01/17/20 112/60   12/26/19 122/74       General-Well Nourished, Well developed  Eyes - PERRLA  Neck- supple, No mass  CV- regular rate and " rhythm, no MRG  Lung- clear bilaterally  Abd- soft, +BS  Musc/skel - Norm strength and range of motion  Skin- warm and dry  Neuro - Alert & Oriented x 3, appropriate mood.    Patient's external notes were reviewed.  Independent interpretation of test performed by another physician in facility were reviewed.  Outside laboratory data was also reviewed.    Medical problems and test results were reviewed with the patient today.     EKG:  (EKG/Tracing has been independently visualized by me and summarized below)      ECG 12 Lead    Date/Time: 12/9/2022 3:26 PM  Performed by: Mathew Warner PA  Authorized by: Mathew Warner PA   Comparison: compared with previous ECG from 7/26/2019  Similar to previous ECG  Rhythm: sinus rhythm  Conduction: conduction normal  ST Segments: ST segments normal  T Waves: T waves normal    Clinical impression: normal ECG            ASSESSMENT   1. Palpitations: negative ZIO monitor, resolved  2. Noncardiac chest pain: Negative stress Echocardiogram 2019, CT calcium score test 121 August 22, 2019  3. Family History of CAD  4.  Chest pain with atypical features  5.  Diabetes mellitus type 2, A1c less than 6        PLAN  · GXT echocardiogram  · Coronary calcium score test  · Consider statin therapy, obtain copy of lipid panel from PCP  · Return to follow-up her office in 1 year sooner as needed        Cardiology/Electrophysiology  12/09/22  14:07 EST  Will Alana GUO

## 2023-01-24 ENCOUNTER — HOSPITAL ENCOUNTER (OUTPATIENT)
Dept: CARDIOLOGY | Facility: HOSPITAL | Age: 53
Discharge: HOME | End: 2023-01-24
Payer: COMMERCIAL

## 2023-01-24 ENCOUNTER — HOSPITAL ENCOUNTER (OUTPATIENT)
Dept: CT IMAGING | Facility: HOSPITAL | Age: 53
Discharge: HOME | End: 2023-01-24
Payer: COMMERCIAL

## 2023-01-24 VITALS — BODY MASS INDEX: 24.8 KG/M2 | WEIGHT: 140 LBS | HEIGHT: 63 IN

## 2023-01-24 DIAGNOSIS — I25.10 CORONARY ARTERY DISEASE INVOLVING NATIVE CORONARY ARTERY OF NATIVE HEART WITHOUT ANGINA PECTORIS: ICD-10-CM

## 2023-01-24 DIAGNOSIS — R00.2 PALPITATIONS: ICD-10-CM

## 2023-01-24 LAB
BH CV ECHO MEAS - EDV(CUBED): 58.5 ML
BH CV ECHO MEAS - EDV(MOD-SP2): 93 ML
BH CV ECHO MEAS - EDV(MOD-SP4): 107 ML
BH CV ECHO MEAS - EF(MOD-BP): 56.1 %
BH CV ECHO MEAS - EF(MOD-SP2): 57.8 %
BH CV ECHO MEAS - EF(MOD-SP4): 54.9 %
BH CV ECHO MEAS - ESV(CUBED): 13.3 ML
BH CV ECHO MEAS - ESV(MOD-SP2): 39.2 ML
BH CV ECHO MEAS - ESV(MOD-SP4): 48.3 ML
BH CV ECHO MEAS - FS: 39 %
BH CV ECHO MEAS - IVS/LVPW: 0.84 CM
BH CV ECHO MEAS - IVSD: 0.72 CM
BH CV ECHO MEAS - LA DIMENSION: 3.7 CM
BH CV ECHO MEAS - LV DIASTOLIC VOL/BSA (35-75): 64.4 CM2
BH CV ECHO MEAS - LV MASS(C)D: 87.4 GRAMS
BH CV ECHO MEAS - LV SYSTOLIC VOL/BSA (12-30): 29.1 CM2
BH CV ECHO MEAS - LVIDD: 3.9 CM
BH CV ECHO MEAS - LVIDS: 2.37 CM
BH CV ECHO MEAS - LVPWD: 0.86 CM
BH CV ECHO MEAS - RAP SYSTOLE: 8 MMHG
BH CV ECHO MEAS - RVSP: 24 MMHG
BH CV ECHO MEAS - SI(MOD-SP2): 32.4 ML/M2
BH CV ECHO MEAS - SI(MOD-SP4): 35.3 ML/M2
BH CV ECHO MEAS - SV(MOD-SP2): 53.8 ML
BH CV ECHO MEAS - SV(MOD-SP4): 58.7 ML
BH CV ECHO MEAS - TR MAX PG: 16.1 MMHG
BH CV ECHO MEAS - TR MAX VEL: 200.9 CM/SEC
BH CV STRESS BP STAGE 1: NORMAL
BH CV STRESS BP STAGE 2: NORMAL
BH CV STRESS DURATION MIN STAGE 1: 3
BH CV STRESS DURATION MIN STAGE 2: 3
BH CV STRESS DURATION MIN STAGE 3: 2
BH CV STRESS DURATION SEC STAGE 1: 0
BH CV STRESS DURATION SEC STAGE 2: 0
BH CV STRESS DURATION SEC STAGE 3: 7
BH CV STRESS ECHO POST STRESS EJECTION FRACTION EF: 60 %
BH CV STRESS GRADE STAGE 1: 10
BH CV STRESS GRADE STAGE 2: 12
BH CV STRESS GRADE STAGE 3: 14
BH CV STRESS HR STAGE 1: 100
BH CV STRESS HR STAGE 2: 117
BH CV STRESS HR STAGE 3: 151
BH CV STRESS METS STAGE 1: 5
BH CV STRESS METS STAGE 2: 7.5
BH CV STRESS METS STAGE 3: 10
BH CV STRESS O2 STAGE 1: 99
BH CV STRESS O2 STAGE 2: 99
BH CV STRESS O2 STAGE 3: 96
BH CV STRESS PROTOCOL 1: NORMAL
BH CV STRESS RECOVERY BP: NORMAL MMHG
BH CV STRESS RECOVERY HR: 80 BPM
BH CV STRESS RECOVERY O2: 96 %
BH CV STRESS SPEED STAGE 1: 1.7
BH CV STRESS SPEED STAGE 2: 2.5
BH CV STRESS SPEED STAGE 3: 3.4
BH CV STRESS STAGE 1: 1
BH CV STRESS STAGE 2: 2
BH CV STRESS STAGE 3: 3
MAXIMAL PREDICTED HEART RATE: 170 BPM
PERCENT MAX PREDICTED HR: 88.82 %
STRESS BASELINE BP: NORMAL MMHG
STRESS BASELINE HR: 57 BPM
STRESS O2 SAT REST: 100 %
STRESS PERCENT HR: 104 %
STRESS POST ESTIMATED WORKLOAD: 10.1 METS
STRESS POST EXERCISE DUR MIN: 8 MIN
STRESS POST EXERCISE DUR SEC: 7 SEC
STRESS POST O2 SAT PEAK: 96 %
STRESS POST PEAK BP: NORMAL MMHG
STRESS POST PEAK HR: 151 BPM
STRESS TARGET HR: 145 BPM

## 2023-01-24 PROCEDURE — 93352 ADMIN ECG CONTRAST AGENT: CPT | Performed by: INTERNAL MEDICINE

## 2023-01-24 PROCEDURE — 93350 STRESS TTE ONLY: CPT

## 2023-01-24 PROCEDURE — 25010000002 SULFUR HEXAFLUORIDE MICROSPH 60.7-25 MG RECONSTITUTED SUSPENSION: Performed by: PHYSICIAN ASSISTANT

## 2023-01-24 PROCEDURE — 93350 STRESS TTE ONLY: CPT | Performed by: INTERNAL MEDICINE

## 2023-01-24 PROCEDURE — 93018 CV STRESS TEST I&R ONLY: CPT | Performed by: INTERNAL MEDICINE

## 2023-01-24 PROCEDURE — 93017 CV STRESS TEST TRACING ONLY: CPT

## 2023-01-24 PROCEDURE — 75571 CT HRT W/O DYE W/CA TEST: CPT

## 2023-01-24 RX ADMIN — SULFUR HEXAFLUORIDE 3 ML: KIT at 15:31

## 2023-02-02 ENCOUNTER — DOCUMENTATION (OUTPATIENT)
Dept: CARDIOLOGY | Facility: CLINIC | Age: 53
End: 2023-02-02
Payer: COMMERCIAL

## 2023-02-02 RX ORDER — ATORVASTATIN CALCIUM 20 MG/1
20 TABLET, FILM COATED ORAL DAILY
Qty: 30 TABLET | Refills: 11 | Status: SHIPPED | OUTPATIENT
Start: 2023-02-02

## 2023-02-02 NOTE — PROGRESS NOTES
Spoke with patient on the phone regarding her stress test results and coronary calcium score test.  She states she has not had any chest pain chest angina pectoris did well with treadmill stress with no symptoms.  She would like to monitor things for now regarding the stress test.  However she did have a moderate level of calcium on her CT coronary calcium score test.  She has an LDL over 100.  She has risk factors of diabetes and family history of coronary disease.  Therefore she would like to start Lipitor 20 mg daily.  She will follow-up CMP and lipid panel with her PCP.

## 2023-05-25 RX ORDER — DEXLANSOPRAZOLE 60 MG/1
CAPSULE, DELAYED RELEASE ORAL
Qty: 90 CAPSULE | Refills: 3 | OUTPATIENT
Start: 2023-05-25

## 2024-03-01 RX ORDER — ATORVASTATIN CALCIUM 20 MG/1
20 TABLET, FILM COATED ORAL DAILY
Qty: 30 TABLET | Refills: 11 | OUTPATIENT
Start: 2024-03-01

## (undated) DEVICE — GLV SURG SENSICARE MICRO PF LF 8.5 STRL

## (undated) DEVICE — ANTIBACTERIAL VIOLET BRAIDED (POLYGLACTIN 910), SYNTHETIC ABSORBABLE SUTURE: Brand: COATED VICRYL

## (undated) DEVICE — PK BARIATRIC 10

## (undated) DEVICE — APPL HEMOS FOR DELIVERY FLOSEAL

## (undated) DEVICE — DRN PENRS 1/2X18IN LTX

## (undated) DEVICE — SUT SILK 2/0 SH 30IN K833H

## (undated) DEVICE — GLV SURG SENSICARE MICRO PF LF 9 STRL

## (undated) DEVICE — FLTR PLUMEPORT LAP W/CONN STRL

## (undated) DEVICE — ECHELON FLEX POWERED PLUS LONG ARTICULATING ENDOSCOPIC LINEAR CUTTER, 60MM: Brand: ECHELON FLEX

## (undated) DEVICE — COVER,LIGHT HANDLE,FLX,1/PK: Brand: MEDLINE INDUSTRIES, INC.

## (undated) DEVICE — ENDOPATH XCEL BLADELESS TROCARS WITH STABILITY SLEEVES: Brand: ENDOPATH XCEL

## (undated) DEVICE — GOWN,SIRUS,NON REINFRCD XL XLONG: Brand: MEDLINE

## (undated) DEVICE — MARYLAND JAW LAPAROSCOPIC SEALER/DIVIDER COATED: Brand: LIGASURE

## (undated) DEVICE — CONTN GRAD MEAS TRIANG 32OZ BLK

## (undated) DEVICE — [HIGH FLOW INSUFFLATOR,  DO NOT USE IF PACKAGE IS DAMAGED,  KEEP DRY,  KEEP AWAY FROM SUNLIGHT,  PROTECT FROM HEAT AND RADIOACTIVE SOURCES.]: Brand: PNEUMOSURE

## (undated) DEVICE — ENDOPATH XCEL UNIVERSAL TROCAR STABLILITY SLEEVES: Brand: ENDOPATH XCEL

## (undated) DEVICE — GOWN,NON-REINFORCED,SIRUS,SET IN SLV,XXL: Brand: MEDLINE